# Patient Record
Sex: FEMALE | Race: WHITE | Employment: FULL TIME | URBAN - METROPOLITAN AREA
[De-identification: names, ages, dates, MRNs, and addresses within clinical notes are randomized per-mention and may not be internally consistent; named-entity substitution may affect disease eponyms.]

---

## 2021-04-28 ENCOUNTER — OFFICE VISIT (OUTPATIENT)
Dept: FAMILY MEDICINE CLINIC | Age: 41
End: 2021-04-28
Payer: COMMERCIAL

## 2021-04-28 VITALS
TEMPERATURE: 98 F | SYSTOLIC BLOOD PRESSURE: 124 MMHG | WEIGHT: 205 LBS | BODY MASS INDEX: 34.16 KG/M2 | OXYGEN SATURATION: 98 % | DIASTOLIC BLOOD PRESSURE: 80 MMHG | HEART RATE: 76 BPM | HEIGHT: 65 IN

## 2021-04-28 DIAGNOSIS — Z00.00 ANNUAL PHYSICAL EXAM: ICD-10-CM

## 2021-04-28 DIAGNOSIS — Z76.89 ENCOUNTER TO ESTABLISH CARE: Primary | ICD-10-CM

## 2021-04-28 DIAGNOSIS — R80.1 PERSISTENT PROTEINURIA: ICD-10-CM

## 2021-04-28 DIAGNOSIS — K21.9 GASTROESOPHAGEAL REFLUX DISEASE WITHOUT ESOPHAGITIS: ICD-10-CM

## 2021-04-28 DIAGNOSIS — Z13.220 LIPID SCREENING: ICD-10-CM

## 2021-04-28 DIAGNOSIS — N18.30 STAGE 3 CHRONIC KIDNEY DISEASE, UNSPECIFIED WHETHER STAGE 3A OR 3B CKD (HCC): ICD-10-CM

## 2021-04-28 DIAGNOSIS — E55.9 VITAMIN D DEFICIENCY: ICD-10-CM

## 2021-04-28 DIAGNOSIS — Z11.59 NEED FOR HEPATITIS C SCREENING TEST: ICD-10-CM

## 2021-04-28 DIAGNOSIS — N05.1 FSGS (FOCAL SEGMENTAL GLOMERULOSCLEROSIS): ICD-10-CM

## 2021-04-28 DIAGNOSIS — Z13.1 SCREENING FOR DIABETES MELLITUS (DM): ICD-10-CM

## 2021-04-28 DIAGNOSIS — J30.2 SEASONAL ALLERGIES: ICD-10-CM

## 2021-04-28 DIAGNOSIS — I10 ESSENTIAL HYPERTENSION: ICD-10-CM

## 2021-04-28 PROCEDURE — G8427 DOCREV CUR MEDS BY ELIG CLIN: HCPCS | Performed by: NURSE PRACTITIONER

## 2021-04-28 PROCEDURE — G8417 CALC BMI ABV UP PARAM F/U: HCPCS | Performed by: NURSE PRACTITIONER

## 2021-04-28 PROCEDURE — 99204 OFFICE O/P NEW MOD 45 MIN: CPT | Performed by: NURSE PRACTITIONER

## 2021-04-28 PROCEDURE — 4004F PT TOBACCO SCREEN RCVD TLK: CPT | Performed by: NURSE PRACTITIONER

## 2021-04-28 RX ORDER — OMEPRAZOLE 20 MG/1
20 CAPSULE, DELAYED RELEASE ORAL 2 TIMES DAILY
Qty: 180 CAPSULE | Refills: 3 | Status: SHIPPED | OUTPATIENT
Start: 2021-04-28 | End: 2021-10-26 | Stop reason: SDUPTHER

## 2021-04-28 RX ORDER — ENALAPRIL MALEATE 10 MG/1
10 TABLET ORAL DAILY
Qty: 90 TABLET | Refills: 3 | Status: SHIPPED | OUTPATIENT
Start: 2021-04-28 | End: 2021-07-13 | Stop reason: SDUPTHER

## 2021-04-28 RX ORDER — MONTELUKAST SODIUM 10 MG/1
10 TABLET ORAL NIGHTLY
Qty: 90 TABLET | Refills: 0 | Status: SHIPPED | OUTPATIENT
Start: 2021-04-28 | End: 2021-07-13

## 2021-04-28 RX ORDER — ZINC GLUCONATE 50 MG
50 TABLET ORAL DAILY
COMMUNITY

## 2021-04-28 RX ORDER — CETIRIZINE HYDROCHLORIDE 10 MG/1
10 TABLET ORAL DAILY
COMMUNITY

## 2021-04-28 SDOH — HEALTH STABILITY: MENTAL HEALTH: HOW MANY STANDARD DRINKS CONTAINING ALCOHOL DO YOU HAVE ON A TYPICAL DAY?: 3 OR 4

## 2021-04-28 SDOH — HEALTH STABILITY: MENTAL HEALTH: HOW OFTEN DO YOU HAVE A DRINK CONTAINING ALCOHOL?: MONTHLY OR LESS

## 2021-04-28 SDOH — ECONOMIC STABILITY: TRANSPORTATION INSECURITY
IN THE PAST 12 MONTHS, HAS LACK OF TRANSPORTATION KEPT YOU FROM MEETINGS, WORK, OR FROM GETTING THINGS NEEDED FOR DAILY LIVING?: NO

## 2021-04-28 SDOH — HEALTH STABILITY: PHYSICAL HEALTH: ON AVERAGE, HOW MANY DAYS PER WEEK DO YOU ENGAGE IN MODERATE TO STRENUOUS EXERCISE (LIKE A BRISK WALK)?: 4 DAYS

## 2021-04-28 SDOH — HEALTH STABILITY: PHYSICAL HEALTH: ON AVERAGE, HOW MANY MINUTES DO YOU ENGAGE IN EXERCISE AT THIS LEVEL?: 60 MIN

## 2021-04-28 SDOH — ECONOMIC STABILITY: INCOME INSECURITY: HOW HARD IS IT FOR YOU TO PAY FOR THE VERY BASICS LIKE FOOD, HOUSING, MEDICAL CARE, AND HEATING?: NOT HARD AT ALL

## 2021-04-28 SDOH — ECONOMIC STABILITY: FOOD INSECURITY: WITHIN THE PAST 12 MONTHS, YOU WORRIED THAT YOUR FOOD WOULD RUN OUT BEFORE YOU GOT MONEY TO BUY MORE.: NEVER TRUE

## 2021-04-28 SDOH — ECONOMIC STABILITY: FOOD INSECURITY: WITHIN THE PAST 12 MONTHS, THE FOOD YOU BOUGHT JUST DIDN'T LAST AND YOU DIDN'T HAVE MONEY TO GET MORE.: NEVER TRUE

## 2021-04-28 ASSESSMENT — PATIENT HEALTH QUESTIONNAIRE - PHQ9
SUM OF ALL RESPONSES TO PHQ QUESTIONS 1-9: 0
1. LITTLE INTEREST OR PLEASURE IN DOING THINGS: 0

## 2021-04-28 NOTE — PROGRESS NOTES
[address] [address] [facility]  [phone]    2021     CHIEF COMPLAINT:     Nathaly Astorga (:  1980) is a 37 y.o. female, here for evaluation of the following chief complaint(s):  Establish Care      REVIEWED INFORMATION      Allergies   Allergen Reactions    Prednisone     Codeine     Darvocet A500 [Propoxyphene N-Acetaminophen]        Current Outpatient Medications   Medication Sig Dispense Refill    cetirizine (ZYRTEC) 10 MG tablet Take 10 mg by mouth daily      zinc 50 MG TABS tablet Take 50 mg by mouth daily otc      enalapril (VASOTEC) 10 MG tablet Take 1 tablet by mouth daily 90 tablet 3    omeprazole (PRILOSEC) 20 MG delayed release capsule Take 1 capsule by mouth 2 times daily 180 capsule 3    montelukast (SINGULAIR) 10 MG tablet Take 1 tablet by mouth nightly 90 tablet 0    glucosamine-chondroitin 500-400 MG CAPS Take by mouth daily      magnesium (MAGNESIUM-OXIDE) 250 MG TABS tablet Take 250 mg by mouth daily      amLODIPine (NORVASC) 5 MG tablet TAKE 1 TABLET BY MOUTH DAILY (Patient taking differently: Take 2.5 mg by mouth daily ) 90 tablet 3    Ascorbic Acid (VITAMIN C) 250 MG tablet Take 500 mg by mouth daily        No current facility-administered medications for this visit. Patient Care Team:  Uri Cos, APRN - CNP as PCP - General (Nurse Practitioner)  Dolph Cos, APRN - CNP as PCP - [facility]  Carolynn Restrepo MD as Consulting Physician (Nephrology)  Craoline Arciniega MD (Obstetrics & Gynecology)    REVIEW OF SYSTEMS:     Review of Systems   Constitutional: Negative for activity change, fatigue and unexpected weight change. HENT: Negative for congestion, ear pain, hearing loss, rhinorrhea and sore throat. Respiratory: Negative for cough and shortness of breath. Cardiovascular: Negative for chest pain, palpitations and leg swelling. Gastrointestinal: Negative for constipation and diarrhea. Musculoskeletal: Negative for arthralgias and gait problem. Neurological: Negative for dizziness, weakness and headaches. Psychiatric/Behavioral: Negative for confusion. The patient is not nervous/anxious. HISTORY OF PRESENT ILLNESS     ESTABLISHING CARE      Gerald Fam is a 37 y.o. female who presents today to establish relationship with office. Patient indicates that no relationship ship has been established with primary care provider in past. The purpose of today's visit is the following: Establish Care      Her chronic medical conditions are noted below:  Patient Active Problem List:     CKD (chronic kidney disease) stage 3, GFR 30-59 ml/min Stable, Monitored by nephrology      FSGS (focal segmental glomerulosclerosis) Stable, Monitored by nephrology     Proteinuria Stable, Monitored nephrology      Essential hypertension Stable, Medicated, Monitored      Gastroesophageal reflux disease without esophagitis Stable, Monitored      Seasonal allergies Stable, Monitored       Patient is a sachi 43-year-old woman who is here today to establish care her list of chronic comorbidities are listed above. She does see certain specialist in order to be able to continue with her care as listed above. Patient overall is stable however wanted to establish with primary care if she has not been seen for several years. Patient overall denies any shortness of breath, palpitation, or chest pain. Denies any muscular skeletal issues at this time. Patient does have a longstanding history of chronic kidney disease for which she is managed by nephrology. Hypertension has also been managed by nephrology, however not all medications were filled by them. Patient's blood pressure is stable today she denies any acute issues.   Preventative care has been discussed with patient labs have been ordered and patient will be seen back for follow-up                 PHYSICAL EXAM:     /80   Pulse 76   Temp 98 °F (36.7 °C)

## 2021-05-02 ASSESSMENT — ENCOUNTER SYMPTOMS
SORE THROAT: 0
RHINORRHEA: 0
SHORTNESS OF BREATH: 0
CONSTIPATION: 0
COUGH: 0
DIARRHEA: 0

## 2021-05-21 LAB
ALBUMIN SERPL-MCNC: 3.9 G/DL
ALP BLD-CCNC: 92 U/L
ALT SERPL-CCNC: 16 U/L
AST SERPL-CCNC: 19 U/L
AVERAGE GLUCOSE: 108
BASOPHILS ABSOLUTE: NORMAL
BASOPHILS RELATIVE PERCENT: NORMAL
BILIRUB SERPL-MCNC: 0.4 MG/DL (ref 0.1–1.4)
BUN BLDV-MCNC: 35 MG/DL
CALCIUM SERPL-MCNC: 9.4 MG/DL
CHLORIDE BLD-SCNC: 108 MMOL/L
CHOLESTEROL, TOTAL: 200 MG/DL
CHOLESTEROL/HDL RATIO: 3.6
CO2: 21 MMOL/L
CREAT SERPL-MCNC: 1.77 MG/DL
EOSINOPHILS ABSOLUTE: NORMAL
EOSINOPHILS RELATIVE PERCENT: NORMAL
GLUCOSE FASTING: 98 MG/DL
HBA1C MFR BLD: 5.4 %
HCT VFR BLD CALC: 39.7 % (ref 36–46)
HDLC SERPL-MCNC: 55 MG/DL (ref 35–70)
HEMOGLOBIN: 13 G/DL (ref 12–16)
LDL CHOLESTEROL CALCULATED: 110 MG/DL (ref 0–160)
LYMPHOCYTES ABSOLUTE: NORMAL
LYMPHOCYTES RELATIVE PERCENT: NORMAL
MCH RBC QN AUTO: 29.4 PG
MCHC RBC AUTO-ENTMCNC: 32.9 G/DL
MCV RBC AUTO: 90 FL
MONOCYTES ABSOLUTE: NORMAL
MONOCYTES RELATIVE PERCENT: NORMAL
NEUTROPHILS ABSOLUTE: NORMAL
NEUTROPHILS RELATIVE PERCENT: NORMAL
NONHDLC SERPL-MCNC: NORMAL MG/DL
PLATELET # BLD: 287 K/ΜL
PMV BLD AUTO: 9.5 FL
POTASSIUM SERPL-SCNC: 4.8 MMOL/L
RBC # BLD: 4.43 10^6/ΜL
SODIUM BLD-SCNC: 141 MMOL/L
TOTAL PROTEIN: 6.8 G/DL (ref 6.4–8.2)
TRIGL SERPL-MCNC: 173 MG/DL
TSH SERPL DL<=0.05 MIU/L-ACNC: 0.94 UIU/ML
VLDLC SERPL CALC-MCNC: 35 MG/DL
WBC # BLD: 8.8 10^3/ML

## 2021-06-10 ENCOUNTER — OFFICE VISIT (OUTPATIENT)
Dept: FAMILY MEDICINE CLINIC | Age: 41
End: 2021-06-10
Payer: COMMERCIAL

## 2021-06-10 VITALS
OXYGEN SATURATION: 99 % | DIASTOLIC BLOOD PRESSURE: 78 MMHG | TEMPERATURE: 97 F | WEIGHT: 200 LBS | BODY MASS INDEX: 33.28 KG/M2 | SYSTOLIC BLOOD PRESSURE: 116 MMHG | HEART RATE: 74 BPM

## 2021-06-10 DIAGNOSIS — I10 ESSENTIAL HYPERTENSION: Primary | ICD-10-CM

## 2021-06-10 DIAGNOSIS — Z13.220 LIPID SCREENING: ICD-10-CM

## 2021-06-10 DIAGNOSIS — Z13.1 SCREENING FOR DIABETES MELLITUS (DM): ICD-10-CM

## 2021-06-10 DIAGNOSIS — E55.9 VITAMIN D DEFICIENCY: ICD-10-CM

## 2021-06-10 DIAGNOSIS — N18.30 STAGE 3 CHRONIC KIDNEY DISEASE, UNSPECIFIED WHETHER STAGE 3A OR 3B CKD (HCC): ICD-10-CM

## 2021-06-10 DIAGNOSIS — I10 ESSENTIAL HYPERTENSION: ICD-10-CM

## 2021-06-10 DIAGNOSIS — Z00.00 ANNUAL PHYSICAL EXAM: ICD-10-CM

## 2021-06-10 PROCEDURE — G8417 CALC BMI ABV UP PARAM F/U: HCPCS | Performed by: NURSE PRACTITIONER

## 2021-06-10 PROCEDURE — 99214 OFFICE O/P EST MOD 30 MIN: CPT | Performed by: NURSE PRACTITIONER

## 2021-06-10 PROCEDURE — 4004F PT TOBACCO SCREEN RCVD TLK: CPT | Performed by: NURSE PRACTITIONER

## 2021-06-10 PROCEDURE — G8427 DOCREV CUR MEDS BY ELIG CLIN: HCPCS | Performed by: NURSE PRACTITIONER

## 2021-06-10 NOTE — PATIENT INSTRUCTIONS
It was my pleasure to meet with you today. Please contact me with any questions or concerns, and please notify myself or our manager if there is anyway we can improve our service in your health care needs.  Below I have listed some instructions and information that pertain to today's visit.    -You have been advised to continue all current medication, otherwise not discussed in today's visit  -New medications and refills will been sent and made available at pharmacy or mail away  -Heathy daily diet to include low salt, low fat heart healthy and low carbohydrate, low sugar diabetic diet  -Drink 6-8 glasses of water daily

## 2021-06-10 NOTE — PROGRESS NOTES
Negative for constipation and diarrhea. Musculoskeletal: Negative for arthralgias and gait problem. Neurological: Negative for dizziness, weakness and headaches. Psychiatric/Behavioral: Negative for confusion. The patient is not nervous/anxious. HISTORY OF PRESENT ILLNESS     Lipid Panel is good - LDL - \"bad cholesterol\" numbers are good. HDL \"good cholesterol\" is borderline. Triglyceride numbers are slightly elevated. At this time I highly encourage a change in lifestyle to include healthy diet of lean means, low carbohydrates, and low sugars. As well increase in physical activity    Thyroid level value is stable. Blood sugar is stable  Liver function is stable  Kidney function is stable  Blood counts are stable with no indication of anemia or low platelets. No changes to plan of care at this time unless addressed in this communication. Continue with any medications you are currently taking, with no change to dosages. I always encourage healthy life style practice including diet low in carbohydrates, hidden sugars, good amount of physical activity, stay hydrated, stress relieve strategies such as meditation and deep breathing, and get a good night sleep. Overall patient is doing well - denies any chest pain, palpitations, or shortness of breath  No other complaints at this time. PHYSICAL EXAM:     /78   Pulse 74   Temp 97 °F (36.1 °C) (Temporal)   Wt 200 lb (90.7 kg)   LMP 05/17/2021   SpO2 99%   BMI 33.28 kg/m²      Physical Exam  Vitals reviewed. Constitutional:       Appearance: Normal appearance. She is not ill-appearing. Cardiovascular:      Rate and Rhythm: Normal rate and regular rhythm. Heart sounds: No murmur heard. No friction rub. No gallop. Pulmonary:      Effort: Pulmonary effort is normal. No respiratory distress. Breath sounds: No wheezing. Abdominal:      General: Bowel sounds are normal.      Palpations: Abdomen is soft.

## 2021-06-22 ASSESSMENT — ENCOUNTER SYMPTOMS
SHORTNESS OF BREATH: 0
DIARRHEA: 0
CONSTIPATION: 0
RHINORRHEA: 0
SORE THROAT: 0
COUGH: 0

## 2021-07-13 DIAGNOSIS — J30.2 SEASONAL ALLERGIES: ICD-10-CM

## 2021-07-13 DIAGNOSIS — I10 ESSENTIAL HYPERTENSION: ICD-10-CM

## 2021-07-13 RX ORDER — ENALAPRIL MALEATE 10 MG/1
10 TABLET ORAL DAILY
Qty: 90 TABLET | Refills: 1 | Status: SHIPPED | OUTPATIENT
Start: 2021-07-13 | End: 2022-01-04

## 2021-07-13 RX ORDER — MONTELUKAST SODIUM 10 MG/1
10 TABLET ORAL NIGHTLY
Qty: 90 TABLET | Refills: 1 | Status: SHIPPED | OUTPATIENT
Start: 2021-07-13 | End: 2022-01-04

## 2021-07-13 NOTE — TELEPHONE ENCOUNTER
LOV 6/10/21  LRF 4/28/21, Both meds  PATIENT SWITCHING TO MAIL AWAY  RTO 1 year,     Health Maintenance   Topic Date Due    Hepatitis C screen  Never done    Pneumococcal 0-64 years Vaccine (1 of 2 - PPSV23) Never done    DTaP/Tdap/Td vaccine (1 - Tdap) Never done    Flu vaccine (1) 09/01/2021    Potassium monitoring  05/21/2022    Creatinine monitoring  05/21/2022    Cervical cancer screen  04/28/2024    Lipid screen  05/21/2026    COVID-19 Vaccine  Completed    Hepatitis A vaccine  Aged Out    Hepatitis B vaccine  Aged Out    Hib vaccine  Aged Out    Meningococcal (ACWY) vaccine  Aged Out    Varicella vaccine  Discontinued    HIV screen  Discontinued             (applicable per patient's age: Cancer Screenings, Depression Screening, Fall Risk Screening, Immunizations)    Hemoglobin A1C (%)   Date Value   05/21/2021 5.4     LDL Calculated (mg/dL)   Date Value   05/21/2021 110     AST (U/L)   Date Value   05/21/2021 19     ALT (U/L)   Date Value   05/21/2021 16     BUN (mg/dL)   Date Value   05/21/2021 35      (goal A1C is < 7)   (goal LDL is <100) need 30-50% reduction from baseline     BP Readings from Last 3 Encounters:   06/10/21 116/78   04/28/21 124/80   02/23/21 122/82    (goal /80)      All Future Testing planned in [facility]:  Lab Frequency Next Occurrence   PTH, Intact Once 07/12/2021   Vitamin D 25 Hydroxy Once 07/12/2021   CBC Auto Differential Once 07/12/2021   Magnesium Once 78/70/8814   Basic Metabolic Panel Once 57/18/5384   Phosphorus Once 07/12/2021   Creatinine, Random Urine Once 07/12/2021   Protein, urine, random Once 07/12/2021   Hepatitis C Antibody Once 04/28/2022   CBC Once 05/21/2022   Comprehensive Metabolic Panel, Fasting Once 05/21/2022   Lipid Panel Once 05/21/2022   TSH without Reflex Once 05/21/2022       Next Visit Date:  Future Appointments   Date Time Provider Tapan Vasquez   8/31/2021  8:50 AM Bikram Asuncion Severe, MD [facility] None            Patient Active Problem List:     CKD (chronic kidney disease) stage 3, GFR 30-59 ml/min ([facility])     FSGS (focal segmental glomerulosclerosis)     Proteinuria     Essential hypertension     Gastroesophageal reflux disease without esophagitis     Seasonal allergies

## 2021-08-23 ENCOUNTER — HOSPITAL ENCOUNTER (OUTPATIENT)
Age: 41
Setting detail: SPECIMEN
Discharge: HOME | End: 2021-08-23
Payer: COMMERCIAL

## 2021-08-23 DIAGNOSIS — I10 ESSENTIAL HYPERTENSION: ICD-10-CM

## 2021-08-23 DIAGNOSIS — N18.31 STAGE 3A CHRONIC KIDNEY DISEASE (HCC): ICD-10-CM

## 2021-08-23 DIAGNOSIS — N05.1 FSGS (FOCAL SEGMENTAL GLOMERULOSCLEROSIS): ICD-10-CM

## 2021-08-23 LAB
ABSOLUTE EOS #: 0.09 K/UL (ref 0–0.44)
ABSOLUTE IMMATURE GRANULOCYTE: 0.03 K/UL (ref 0–0.3)
ABSOLUTE LYMPH #: 2.64 K/UL (ref 1.1–3.7)
ABSOLUTE MONO #: 0.56 K/UL (ref 0.1–1.2)
ANION GAP SERPL CALCULATED.3IONS-SCNC: 16 MMOL/L (ref 9–17)
BASOPHILS # BLD: 1 % (ref 0–2)
BASOPHILS ABSOLUTE: 0.08 K/UL (ref 0–0.2)
BUN BLDV-MCNC: 41 MG/DL (ref 6–20)
BUN/CREAT BLD: ABNORMAL (ref 9–20)
CALCIUM SERPL-MCNC: 8.6 MG/DL (ref 8.6–10.4)
CHLORIDE BLD-SCNC: 107 MMOL/L (ref 98–107)
CO2: 15 MMOL/L (ref 20–31)
CREAT SERPL-MCNC: 1.87 MG/DL (ref 0.5–0.9)
CREATININE URINE: 67 MG/DL (ref 28–217)
DIFFERENTIAL TYPE: ABNORMAL
EOSINOPHILS RELATIVE PERCENT: 1 % (ref 1–4)
GFR AFRICAN AMERICAN: 36 ML/MIN
GFR NON-AFRICAN AMERICAN: 30 ML/MIN
GFR SERPL CREATININE-BSD FRML MDRD: ABNORMAL ML/MIN/{1.73_M2}
GFR SERPL CREATININE-BSD FRML MDRD: ABNORMAL ML/MIN/{1.73_M2}
GLUCOSE BLD-MCNC: 183 MG/DL (ref 70–99)
HCT VFR BLD CALC: 37.5 % (ref 36.3–47.1)
HEMOGLOBIN: 11.6 G/DL (ref 11.9–15.1)
IMMATURE GRANULOCYTES: 0 %
LYMPHOCYTES # BLD: 28 % (ref 24–43)
MAGNESIUM: 2.1 MG/DL (ref 1.6–2.6)
MCH RBC QN AUTO: 29.7 PG (ref 25.2–33.5)
MCHC RBC AUTO-ENTMCNC: 30.9 G/DL (ref 28.4–34.8)
MCV RBC AUTO: 95.9 FL (ref 82.6–102.9)
MONOCYTES # BLD: 6 % (ref 3–12)
NRBC AUTOMATED: 0 PER 100 WBC
PDW BLD-RTO: 15.7 % (ref 11.8–14.4)
PHOSPHORUS: 3.4 MG/DL (ref 2.6–4.5)
PLATELET # BLD: 292 K/UL (ref 138–453)
PLATELET ESTIMATE: ABNORMAL
PMV BLD AUTO: 11 FL (ref 8.1–13.5)
POTASSIUM SERPL-SCNC: 4.9 MMOL/L (ref 3.7–5.3)
PTH INTACT: 89.35 PG/ML (ref 15–65)
RBC # BLD: 3.91 M/UL (ref 3.95–5.11)
RBC # BLD: ABNORMAL 10*6/UL
SEG NEUTROPHILS: 64 % (ref 36–65)
SEGMENTED NEUTROPHILS ABSOLUTE COUNT: 6.02 K/UL (ref 1.5–8.1)
SODIUM BLD-SCNC: 138 MMOL/L (ref 135–144)
TOTAL PROTEIN, URINE: 22 MG/DL
VITAMIN D 25-HYDROXY: 29.1 NG/ML (ref 30–100)
WBC # BLD: 9.4 K/UL (ref 3.5–11.3)
WBC # BLD: ABNORMAL 10*3/UL

## 2021-10-26 DIAGNOSIS — K21.9 GASTROESOPHAGEAL REFLUX DISEASE WITHOUT ESOPHAGITIS: ICD-10-CM

## 2021-10-26 RX ORDER — OMEPRAZOLE 20 MG/1
20 CAPSULE, DELAYED RELEASE ORAL 2 TIMES DAILY
Qty: 180 CAPSULE | Refills: 3 | Status: SHIPPED | OUTPATIENT
Start: 2021-10-26 | End: 2022-08-10 | Stop reason: SDUPTHER

## 2021-10-26 NOTE — TELEPHONE ENCOUNTER
LOV 6/10/21  LRF 4/28/21 Patient switching Pharmacies  RTO 1 year    Health Maintenance   Topic Date Due    Hepatitis C screen  Never done    Pneumococcal 0-64 years Vaccine (1 of 2 - PPSV23) Never done    DTaP/Tdap/Td vaccine (1 - Tdap) Never done    Flu vaccine (1) 09/01/2021    Potassium monitoring  08/23/2022    Creatinine monitoring  08/23/2022    Cervical cancer screen  04/28/2024    Diabetes screen  05/21/2024    Lipid screen  05/21/2026    COVID-19 Vaccine  Completed    Hepatitis A vaccine  Aged Out    Hepatitis B vaccine  Aged Out    Hib vaccine  Aged Out    Meningococcal (ACWY) vaccine  Aged Out    Varicella vaccine  Discontinued    HIV screen  Discontinued             (applicable per patient's age: Cancer Screenings, Depression Screening, Fall Risk Screening, Immunizations)    Hemoglobin A1C (%)   Date Value   05/21/2021 5.4     LDL Calculated (mg/dL)   Date Value   05/21/2021 110     AST (U/L)   Date Value   05/21/2021 19     ALT (U/L)   Date Value   05/21/2021 16     BUN (mg/dL)   Date Value   08/23/2021 41 (H)      (goal A1C is < 7)   (goal LDL is <100) need 30-50% reduction from baseline     BP Readings from Last 3 Encounters:   08/31/21 110/68   06/10/21 116/78   04/28/21 124/80    (goal /80)      All Future Testing planned in [facility]:  Lab Frequency Next Occurrence   Hepatitis C Antibody Once 04/28/2022   CBC Once 05/21/2022   Comprehensive Metabolic Panel, Fasting Once 05/21/2022   Lipid Panel Once 05/21/2022   TSH without Reflex Once 05/21/2022   Vitamin D 25 Hydroxy Once 01/17/2022   PTH, Intact Once 01/17/2022   CBC Auto Differential Once 01/17/2022   Magnesium Once 01/17/2022   Phosphorus Once 91/69/2731   Basic Metabolic Panel Once 90/98/1080   Creatinine, Random Urine Once 01/17/2022   Protein, urine, random Once 01/17/2022       Next Visit Date:  Future Appointments   Date Time Provider Tapan Vasquez   2/15/2022  8:30 AM Jose Luis George MD [facility] None

## 2022-01-03 DIAGNOSIS — J30.2 SEASONAL ALLERGIES: ICD-10-CM

## 2022-01-03 DIAGNOSIS — I10 ESSENTIAL HYPERTENSION: ICD-10-CM

## 2022-01-04 NOTE — TELEPHONE ENCOUNTER
LOV 6/10/21  LRF 7/13/21  RTO 1 year    Health Maintenance   Topic Date Due    Hepatitis C screen  Never done    Pneumococcal 0-64 years Vaccine (1 of 2 - PPSV23) Never done    DTaP/Tdap/Td vaccine (1 - Tdap) Never done    Flu vaccine (1) 09/01/2021    COVID-19 Vaccine (3 - Booster for Pfizer series) 11/07/2021    Depression Screen  04/28/2022    Potassium monitoring  08/23/2022    Creatinine monitoring  08/23/2022    Cervical cancer screen  04/28/2024    Diabetes screen  05/21/2024    Lipid screen  05/21/2026    Hepatitis A vaccine  Aged Out    Hepatitis B vaccine  Aged Out    Hib vaccine  Aged Out    Meningococcal (ACWY) vaccine  Aged Out    Varicella vaccine  Discontinued    HIV screen  Discontinued             (applicable per patient's age: Cancer Screenings, Depression Screening, Fall Risk Screening, Immunizations)    Hemoglobin A1C (%)   Date Value   05/21/2021 5.4     LDL Calculated (mg/dL)   Date Value   05/21/2021 110     AST (U/L)   Date Value   05/21/2021 19     ALT (U/L)   Date Value   05/21/2021 16     BUN (mg/dL)   Date Value   08/23/2021 41 (H)      (goal A1C is < 7)   (goal LDL is <100) need 30-50% reduction from baseline     BP Readings from Last 3 Encounters:   08/31/21 110/68   06/10/21 116/78   04/28/21 124/80    (goal /80)      All Future Testing planned in [facility]:  Lab Frequency Next Occurrence   Hepatitis C Antibody Once 04/28/2022   CBC Once 05/21/2022   Comprehensive Metabolic Panel, Fasting Once 05/21/2022   Lipid Panel Once 05/21/2022   TSH without Reflex Once 05/21/2022   Vitamin D 25 Hydroxy Once 01/17/2022   PTH, Intact Once 01/17/2022   CBC Auto Differential Once 01/17/2022   Magnesium Once 01/17/2022   Phosphorus Once 18/19/6409   Basic Metabolic Panel Once 59/61/8284   Creatinine, Random Urine Once 01/17/2022   Protein, urine, random Once 01/17/2022       Next Visit Date:  Future Appointments   Date Time Provider Tapan Vasquez   2/15/2022  8:30 AM Jose Luis Jaquan Lees MD [facility] None            Patient Active Problem List:     CKD (chronic kidney disease) stage 3, GFR 30-59 ml/min ([facility])     FSGS (focal segmental glomerulosclerosis)     Proteinuria     Essential hypertension     Gastroesophageal reflux disease without esophagitis     Seasonal allergies

## 2022-01-05 RX ORDER — MONTELUKAST SODIUM 10 MG/1
10 TABLET ORAL NIGHTLY
Qty: 90 TABLET | Refills: 3 | Status: SHIPPED | OUTPATIENT
Start: 2022-01-05

## 2022-01-05 RX ORDER — ENALAPRIL MALEATE 10 MG/1
10 TABLET ORAL DAILY
Qty: 90 TABLET | Refills: 3 | Status: SHIPPED | OUTPATIENT
Start: 2022-01-05

## 2022-02-09 ENCOUNTER — HOSPITAL ENCOUNTER (OUTPATIENT)
Age: 42
Setting detail: SPECIMEN
Discharge: HOME | End: 2022-02-09

## 2022-02-09 DIAGNOSIS — N18.31 STAGE 3A CHRONIC KIDNEY DISEASE (HCC): ICD-10-CM

## 2022-02-09 DIAGNOSIS — N05.1 FSGS (FOCAL SEGMENTAL GLOMERULOSCLEROSIS): ICD-10-CM

## 2022-02-09 LAB
ABSOLUTE EOS #: 0.09 K/UL (ref 0–0.44)
ABSOLUTE IMMATURE GRANULOCYTE: 0.03 K/UL (ref 0–0.3)
ABSOLUTE LYMPH #: 2.92 K/UL (ref 1.1–3.7)
ABSOLUTE MONO #: 0.71 K/UL (ref 0.1–1.2)
ANION GAP SERPL CALCULATED.3IONS-SCNC: 16 MMOL/L (ref 9–17)
BASOPHILS # BLD: 1 % (ref 0–2)
BASOPHILS ABSOLUTE: 0.11 K/UL (ref 0–0.2)
BUN BLDV-MCNC: 47 MG/DL (ref 6–20)
BUN/CREAT BLD: ABNORMAL (ref 9–20)
CALCIUM SERPL-MCNC: 9.5 MG/DL (ref 8.6–10.4)
CHLORIDE BLD-SCNC: 107 MMOL/L (ref 98–107)
CO2: 16 MMOL/L (ref 20–31)
CREAT SERPL-MCNC: 1.84 MG/DL (ref 0.5–0.9)
CREATININE URINE: 89.9 MG/DL (ref 28–217)
DIFFERENTIAL TYPE: ABNORMAL
EOSINOPHILS RELATIVE PERCENT: 1 % (ref 1–4)
GFR AFRICAN AMERICAN: 37 ML/MIN
GFR NON-AFRICAN AMERICAN: 30 ML/MIN
GFR SERPL CREATININE-BSD FRML MDRD: ABNORMAL ML/MIN/{1.73_M2}
GFR SERPL CREATININE-BSD FRML MDRD: ABNORMAL ML/MIN/{1.73_M2}
GLUCOSE BLD-MCNC: 84 MG/DL (ref 70–99)
HCT VFR BLD CALC: 37.6 % (ref 36.3–47.1)
HEMOGLOBIN: 12 G/DL (ref 11.9–15.1)
IMMATURE GRANULOCYTES: 0 %
LYMPHOCYTES # BLD: 34 % (ref 24–43)
MAGNESIUM: 2 MG/DL (ref 1.6–2.6)
MCH RBC QN AUTO: 30.2 PG (ref 25.2–33.5)
MCHC RBC AUTO-ENTMCNC: 31.9 G/DL (ref 28.4–34.8)
MCV RBC AUTO: 94.7 FL (ref 82.6–102.9)
MONOCYTES # BLD: 8 % (ref 3–12)
NRBC AUTOMATED: 0 PER 100 WBC
PDW BLD-RTO: 14.5 % (ref 11.8–14.4)
PHOSPHORUS: 4.3 MG/DL (ref 2.6–4.5)
PLATELET # BLD: 290 K/UL (ref 138–453)
PLATELET ESTIMATE: ABNORMAL
PMV BLD AUTO: 10.9 FL (ref 8.1–13.5)
POTASSIUM SERPL-SCNC: 4.9 MMOL/L (ref 3.7–5.3)
PTH INTACT: 69.05 PG/ML (ref 15–65)
RBC # BLD: 3.97 M/UL (ref 3.95–5.11)
RBC # BLD: ABNORMAL 10*6/UL
SEG NEUTROPHILS: 56 % (ref 36–65)
SEGMENTED NEUTROPHILS ABSOLUTE COUNT: 4.66 K/UL (ref 1.5–8.1)
SODIUM BLD-SCNC: 139 MMOL/L (ref 135–144)
TOTAL PROTEIN, URINE: 34 MG/DL
WBC # BLD: 8.5 K/UL (ref 3.5–11.3)
WBC # BLD: ABNORMAL 10*3/UL

## 2022-02-10 LAB — VITAMIN D 25-HYDROXY: 29 NG/ML (ref 30–100)

## 2022-04-27 ENCOUNTER — TELEMEDICINE (OUTPATIENT)
Dept: FAMILY MEDICINE CLINIC | Age: 42
End: 2022-04-27
Payer: COMMERCIAL

## 2022-04-27 DIAGNOSIS — J01.00 ACUTE MAXILLARY SINUSITIS, RECURRENCE NOT SPECIFIED: Primary | ICD-10-CM

## 2022-04-27 PROCEDURE — 99214 OFFICE O/P EST MOD 30 MIN: CPT | Performed by: NURSE PRACTITIONER

## 2022-04-27 PROCEDURE — G8427 DOCREV CUR MEDS BY ELIG CLIN: HCPCS | Performed by: NURSE PRACTITIONER

## 2022-04-27 RX ORDER — AMOXICILLIN 500 MG/1
500 CAPSULE ORAL 2 TIMES DAILY
Qty: 14 CAPSULE | Refills: 0 | Status: SHIPPED | OUTPATIENT
Start: 2022-04-27 | End: 2022-05-04

## 2022-04-27 RX ORDER — FLUTICASONE PROPIONATE 50 MCG
2 SPRAY, SUSPENSION (ML) NASAL DAILY
Qty: 48 G | Refills: 1 | Status: SHIPPED | OUTPATIENT
Start: 2022-04-27

## 2022-04-27 RX ORDER — PREDNISONE 20 MG/1
20 TABLET ORAL DAILY
Qty: 5 TABLET | Refills: 0 | Status: SHIPPED | OUTPATIENT
Start: 2022-04-27 | End: 2022-05-02

## 2022-04-27 ASSESSMENT — PATIENT HEALTH QUESTIONNAIRE - PHQ9
1. LITTLE INTEREST OR PLEASURE IN DOING THINGS: 0
2. FEELING DOWN, DEPRESSED OR HOPELESS: 0
SUM OF ALL RESPONSES TO PHQ QUESTIONS 1-9: 0
SUM OF ALL RESPONSES TO PHQ9 QUESTIONS 1 & 2: 0
SUM OF ALL RESPONSES TO PHQ QUESTIONS 1-9: 0

## 2022-04-27 ASSESSMENT — ANXIETY QUESTIONNAIRES
7. FEELING AFRAID AS IF SOMETHING AWFUL MIGHT HAPPEN: 0
GAD7 TOTAL SCORE: 0
1. FEELING NERVOUS, ANXIOUS, OR ON EDGE: 0
3. WORRYING TOO MUCH ABOUT DIFFERENT THINGS: 0
5. BEING SO RESTLESS THAT IT IS HARD TO SIT STILL: 0
2. NOT BEING ABLE TO STOP OR CONTROL WORRYING: 0
4. TROUBLE RELAXING: 0
6. BECOMING EASILY ANNOYED OR IRRITABLE: 0
IF YOU CHECKED OFF ANY PROBLEMS ON THIS QUESTIONNAIRE, HOW DIFFICULT HAVE THESE PROBLEMS MADE IT FOR YOU TO DO YOUR WORK, TAKE CARE OF THINGS AT HOME, OR GET ALONG WITH OTHER PEOPLE: NOT DIFFICULT AT ALL

## 2022-04-27 ASSESSMENT — ENCOUNTER SYMPTOMS: COUGH: 1

## 2022-04-27 NOTE — PATIENT INSTRUCTIONS
[employer]Med NasoGel for Dry Noses    Sodium HyaluronateAloe Vera NasoGel provides moisture to hydrate and lubricate dry and irritated nasal passages caused by dry climate and indoor heat; It helps reduce nasal dryness experienced during air travel, oxygen and CPAP use, as well as dryness symptoms caused by atrophic rhinitis, post radiation therapy and sinus surgery. Suggested Use Apply a small amount of NasoGEL into each nostril every 4 to 6 hours.

## 2022-04-27 NOTE — PROGRESS NOTES
Sujata Trinh (:  1980) is a Established patient, here for evaluation of the following:    Chief Complaint   Patient presents with    Sinusitis     x1 month     Cough     x1 month, pt has lost her voice         Assessment & Plan   Below is the assessment and plan developed based on review of pertinent history, physical exam, labs, studies, and medications. 1. Acute maxillary sinusitis, recurrence not specified  -     predniSONE (DELTASONE) 20 MG tablet; Take 1 tablet by mouth daily for 5 days, Disp-5 tablet, R-0Normal  -     fluticasone (FLONASE) 50 MCG/ACT nasal spray; 2 sprays by Each Nostril route daily, Disp-48 g, R-1Normal  -     amoxicillin (AMOXIL) 500 MG capsule; Take 1 capsule by mouth 2 times daily for 7 days, Disp-14 capsule, R-0Normal    Return if symptoms worsen or fail to improve. Subjective   Cough   Post nasal drip    No sore throat  Sinus congestion  No ear pain    Mucous drainag clear with slight yellow tint    No fever     Reports that she has consitently for the last 2 weeks. Mucinex, tylenol cold and sinus     Sinusitis  Associated symptoms include coughing. Cough      Review of Systems   Respiratory: Positive for cough. Objective   Patient-Reported Vitals  No data recorded     Physical Exam  Constitutional:       Appearance: Normal appearance. She is well-developed. She is ill-appearing. Eyes:      General:         Right eye: No discharge. Left eye: No discharge. Extraocular Movements: Extraocular movements intact. Conjunctiva/sclera: Conjunctivae normal.   Neck:      Thyroid: No thyroid mass. Vascular: No JVD. Pulmonary:      Effort: Pulmonary effort is normal. No respiratory distress. Musculoskeletal:      Right shoulder: No deformity. Normal range of motion. Left shoulder: No deformity. Normal range of motion. Cervical back: Normal range of motion.    Skin:     General: Skin is moist.      Coloration: Skin is not cyanotic or jaundiced. Findings: No rash. Neurological:      General: No focal deficit present. Mental Status: She is alert and oriented to person, place, and time. Gait: Gait is intact. Psychiatric:         Attention and Perception: Attention normal. She does not perceive auditory or visual hallucinations. Mood and Affect: Mood normal.         Speech: Speech normal.                  Natalia Castellon was evaluated through a synchronous (real-time) audio-video encounter. The patient (or guardian if applicable) is aware that this is a billable service, which includes applicable co-pays. This Virtual Visit was conducted with patient's (and/or legal guardian's) consent. The visit was conducted pursuant to the emergency declaration under the [address] [place] [address], [address] [place] [address] authority and the [employer] General Act. Patient identification was verified, and a caregiver was present when appropriate. The patient was located at home in a state where the provider was licensed to provide care.        --Steffanie Valdez, APRN - CNP

## 2022-08-10 ENCOUNTER — OFFICE VISIT (OUTPATIENT)
Dept: FAMILY MEDICINE CLINIC | Age: 42
End: 2022-08-10
Payer: COMMERCIAL

## 2022-08-10 VITALS
WEIGHT: 208 LBS | OXYGEN SATURATION: 99 % | HEART RATE: 102 BPM | SYSTOLIC BLOOD PRESSURE: 110 MMHG | HEIGHT: 65 IN | BODY MASS INDEX: 34.66 KG/M2 | TEMPERATURE: 97.4 F | DIASTOLIC BLOOD PRESSURE: 70 MMHG

## 2022-08-10 DIAGNOSIS — K21.9 GASTROESOPHAGEAL REFLUX DISEASE WITHOUT ESOPHAGITIS: ICD-10-CM

## 2022-08-10 DIAGNOSIS — D64.9 LOW HEMOGLOBIN: Primary | ICD-10-CM

## 2022-08-10 DIAGNOSIS — R10.30 LOWER ABDOMINAL PAIN: ICD-10-CM

## 2022-08-10 DIAGNOSIS — R19.5 LOOSE STOOLS: ICD-10-CM

## 2022-08-10 PROCEDURE — G8417 CALC BMI ABV UP PARAM F/U: HCPCS | Performed by: NURSE PRACTITIONER

## 2022-08-10 PROCEDURE — 99214 OFFICE O/P EST MOD 30 MIN: CPT | Performed by: NURSE PRACTITIONER

## 2022-08-10 PROCEDURE — G8427 DOCREV CUR MEDS BY ELIG CLIN: HCPCS | Performed by: NURSE PRACTITIONER

## 2022-08-10 PROCEDURE — 1036F TOBACCO NON-USER: CPT | Performed by: NURSE PRACTITIONER

## 2022-08-10 RX ORDER — OMEPRAZOLE 20 MG/1
20 CAPSULE, DELAYED RELEASE ORAL 2 TIMES DAILY
Qty: 180 CAPSULE | Refills: 3 | Status: SHIPPED | OUTPATIENT
Start: 2022-08-10 | End: 2023-08-10

## 2022-08-10 SDOH — ECONOMIC STABILITY: FOOD INSECURITY: WITHIN THE PAST 12 MONTHS, THE FOOD YOU BOUGHT JUST DIDN'T LAST AND YOU DIDN'T HAVE MONEY TO GET MORE.: NEVER TRUE

## 2022-08-10 SDOH — ECONOMIC STABILITY: FOOD INSECURITY: WITHIN THE PAST 12 MONTHS, YOU WORRIED THAT YOUR FOOD WOULD RUN OUT BEFORE YOU GOT MONEY TO BUY MORE.: NEVER TRUE

## 2022-08-10 ASSESSMENT — SOCIAL DETERMINANTS OF HEALTH (SDOH): HOW HARD IS IT FOR YOU TO PAY FOR THE VERY BASICS LIKE FOOD, HOUSING, MEDICAL CARE, AND HEATING?: NOT HARD AT ALL

## 2022-08-10 ASSESSMENT — ANXIETY QUESTIONNAIRES
5. BEING SO RESTLESS THAT IT IS HARD TO SIT STILL: 0
6. BECOMING EASILY ANNOYED OR IRRITABLE: 1
3. WORRYING TOO MUCH ABOUT DIFFERENT THINGS: 0
IF YOU CHECKED OFF ANY PROBLEMS ON THIS QUESTIONNAIRE, HOW DIFFICULT HAVE THESE PROBLEMS MADE IT FOR YOU TO DO YOUR WORK, TAKE CARE OF THINGS AT HOME, OR GET ALONG WITH OTHER PEOPLE: NOT DIFFICULT AT ALL
4. TROUBLE RELAXING: 0
GAD7 TOTAL SCORE: 1
7. FEELING AFRAID AS IF SOMETHING AWFUL MIGHT HAPPEN: 0
2. NOT BEING ABLE TO STOP OR CONTROL WORRYING: 0
1. FEELING NERVOUS, ANXIOUS, OR ON EDGE: 0

## 2022-08-10 ASSESSMENT — ENCOUNTER SYMPTOMS
VOMITING: 0
SORE THROAT: 0
NAUSEA: 0
DIARRHEA: 0
CONSTIPATION: 0
RHINORRHEA: 0
COUGH: 0
ABDOMINAL PAIN: 1
SHORTNESS OF BREATH: 0

## 2022-08-10 ASSESSMENT — PATIENT HEALTH QUESTIONNAIRE - PHQ9
SUM OF ALL RESPONSES TO PHQ QUESTIONS 1-9: 0
SUM OF ALL RESPONSES TO PHQ9 QUESTIONS 1 & 2: 0
2. FEELING DOWN, DEPRESSED OR HOPELESS: 0
1. LITTLE INTEREST OR PLEASURE IN DOING THINGS: 0
SUM OF ALL RESPONSES TO PHQ QUESTIONS 1-9: 0

## 2022-08-10 NOTE — PROGRESS NOTES
[address] [facility]   [address]  [phone]    8/10/2022     CHIEF COMPLAINT:     Nik Buckner (:  1980) is a 40 y.o. female, here for evaluation of the following chief complaint(s):  Pelvic Pain (Pt has had the pelvic pain since November, she has had a vaginal US in November, Pravirginiair said she needed to f/u with PCP because the pain has not went away, pt cant stand or sit for long periods of time without pain)      REVIEWED INFORMATION      Allergies   Allergen Reactions    Prednisone     Codeine     Darvocet A500 [Propoxyphene N-Acetaminophen]        Current Outpatient Medications   Medication Sig Dispense Refill    omeprazole (PRILOSEC) 20 MG delayed release capsule Take 1 capsule by mouth in the morning and 1 capsule before bedtime. 180 capsule 3    fluticasone (FLONASE) 50 MCG/ACT nasal spray 2 sprays by Each Nostril route daily 48 g 1    enalapril (VASOTEC) 10 MG tablet Take 1 tablet by mouth daily 90 tablet 3    montelukast (SINGULAIR) 10 MG tablet Take 1 tablet by mouth nightly 90 tablet 3    Drospirenone 4 MG TABS Take by mouth      cetirizine (ZYRTEC) 10 MG tablet Take 10 mg by mouth daily      zinc 50 MG TABS tablet Take 50 mg by mouth daily otc      glucosamine-chondroitin 500-400 MG CAPS Take by mouth daily      magnesium (MAGNESIUM-OXIDE) 250 MG TABS tablet Take 250 mg by mouth daily      amLODIPine (NORVASC) 5 MG tablet TAKE 1 TABLET BY MOUTH DAILY 90 tablet 3    Ascorbic Acid (VITAMIN C) 250 MG tablet Take 500 mg by mouth daily        No current facility-administered medications for this visit.         Patient Care Team:  TISH Vaughn CNP as PCP - General (Nurse Practitioner)  TISH Vaughn CNP as PCP - [facility]  Yasmine Parehk MD as Consulting Physician (Nephrology)  Kaya Jones MD (Obstetrics & Gynecology)    REVIEW OF SYSTEMS:     Review of Systems   Constitutional:  Negative for activity change, fatigue and unexpected weight change. HENT:  Negative for congestion, ear pain, hearing loss, rhinorrhea and sore throat. Respiratory:  Negative for cough and shortness of breath. Cardiovascular:  Negative for chest pain, palpitations and leg swelling. Gastrointestinal:  Positive for abdominal pain. Negative for constipation, diarrhea, nausea and vomiting. Musculoskeletal:  Negative for arthralgias and gait problem. Neurological:  Negative for dizziness, weakness and headaches. Psychiatric/Behavioral:  Positive for sleep disturbance. Negative for confusion. The patient is not nervous/anxious. HISTORY OF PRESENT ILLNESS     Patient presents today for complaint of lower abdominal/pelvic pain. Patient reports pain has been present for over 6 months. Reports difficulty sleeping and laying in certain positions. Patient is present with certain positions, there does not appear to be anything it improves it. Reports that she has loose frequent stools through the day. Has noticed some mild blood smearing, but related to hemorrhoids. Denies any dark stools. Does report loose stools, with a shiny appearance at times. Patient does not notice any significant difference with any certain foods. She does have a history of reflux, for which she has been treated with omeprazole twice daily for several years. Denies any nausea, or vomiting. Denies any fever, or chills. Review of patient's labs show that patient has a two-point drop in her hemoglobin. She does have a history of chronic kidney disease which showing an elevation in her kidney function at this time. As hemoglobin may be related to her kidney disease, she denies any dark stools as noted before. And she reports only small amounts of blood when wiping,. She denies any shortness of breath, or excessive fatigue. I still have concerns in regards to the two-point drop.   With patient's symptoms we are going to complete testing today and I will refer patient to GI for colonoscopy. PHYSICAL EXAM:     /70 (Site: Left Upper Arm, Position: Sitting, Cuff Size: Large Adult)   Pulse (!) 102   Temp 97.4 °F (36.3 °C) (Tympanic)   Ht 5' 5\" (1.651 m)   Wt 208 lb (94.3 kg)   SpO2 99%   BMI 34.61 kg/m²      Physical Exam  Vitals reviewed. Constitutional:       Appearance: Normal appearance. She is not ill-appearing. Cardiovascular:      Rate and Rhythm: Normal rate and regular rhythm. Heart sounds: No murmur heard. No friction rub. No gallop. Pulmonary:      Effort: Pulmonary effort is normal. No respiratory distress. Breath sounds: No wheezing. Abdominal:      General: Abdomen is protuberant. Bowel sounds are normal.      Palpations: Abdomen is soft. Tenderness: There is abdominal tenderness in the right lower quadrant and left lower quadrant. Hernia: No hernia is present. There is no hernia in the umbilical area. Musculoskeletal:      Right lower leg: No edema. Left lower leg: No edema. Skin:     General: Skin is warm. Findings: No erythema, lesion or rash. Neurological:      Mental Status: She is alert. Psychiatric:         Mood and Affect: Mood normal.         Behavior: Behavior is cooperative. PROCEDURE/ IN OFFICE TESTING/ LAB REVIEW     No in office testing or procedures completed during today's office visit. ASSESSMENT/PLAN/ FOLLOWUP:     PLEASE NOTE THAT ANY DISCONTINUATION OF MEDICATIONS OR MEDICAL SUPPLIES REFLECTED IN TODAY'S VISIT SUMMARY  MAY NOT HAVE COMPLETED AS A CHANGE IN YOUR PLAN OF CARE. THESE CHANGES MAY HAVE ONLY BEEN DONE SO IN ORDER TO CLEAN UP LIST FROM DUPLICATIONS OR MISCELLANEOUS SUPPLIES ONLY NEEDED PERIODIC REORDERS. DO NOT DISCONTINUE MEDICATIONS LISTED UNLESS SPECIFICALLY DISCUSSED IN YOUR APPOINTMENT WITH PROVIDER OR SPECIALIST, IF YOU HAVE AN QUESTIONS, PLEASE CONTACT YOUR PROVIDER FOR CLARIFICATION IF NOT ADDRESSED IN YOUR PLAN OF CARE.      1. Low hemoglobin  -     Ayaz Ortiz MD, Gastroenterology, [place]  -     CBC; Future  -     CT ABDOMEN PELVIS WO CONTRAST Additional Contrast? None; Future  2. Gastroesophageal reflux disease without esophagitis  -     omeprazole (PRILOSEC) 20 MG delayed release capsule; Take 1 capsule by mouth in the morning and 1 capsule before bedtime. , Disp-180 capsule, R-3Normal  3. Lower abdominal pain  -     Comprehensive Metabolic Panel, Fasting; Future  -     Amylase; Future  -     Lipase; Future  -     Sedimentation Rate; Future  -     C-Reactive Protein; Future  -     Calprotectin Stool; Future  -     Ayaz Ortiz MD, Gastroenterology, [place]  -     CT ABDOMEN PELVIS WO CONTRAST Additional Contrast? None; Future  4. Loose stools  -     Comprehensive Metabolic Panel, Fasting; Future  -     Amylase; Future  -     Lipase; Future  -     Sedimentation Rate; Future  -     C-Reactive Protein; Future  -     Calprotectin Stool; Future  -     Ayaz Ortiz MD, Gastroenterology, [place]  -     CT ABDOMEN PELVIS WO CONTRAST Additional Contrast? None; Future      Return if symptoms worsen or fail to improve pending testing results. COMMUNICATION:       On this date 8/10/2022 I have spent 30 minutes reviewing previous notes, test results and face to face with the patient discussing the diagnosis and importance of compliance with the treatment plan as well as documenting on the day of the visit. The best way to find yourself is to lose yourself in the service of others - [address] [place] [address]. [facility]   [email or web address]  Office: [phone]     An electronic signature was used to authenticate this note.   Signed by TISH Rubio CNP, APRN-CNP on 8/10/2022 at 10:47 AM

## 2022-08-12 ENCOUNTER — HOSPITAL ENCOUNTER (OUTPATIENT)
Age: 42
Setting detail: SPECIMEN
Discharge: HOME | End: 2022-08-12

## 2022-08-12 DIAGNOSIS — R10.30 LOWER ABDOMINAL PAIN: ICD-10-CM

## 2022-08-12 DIAGNOSIS — D64.9 LOW HEMOGLOBIN: ICD-10-CM

## 2022-08-12 DIAGNOSIS — R19.5 LOOSE STOOLS: ICD-10-CM

## 2022-08-12 LAB
ALBUMIN SERPL-MCNC: 4.1 G/DL (ref 3.5–5.2)
ALBUMIN/GLOBULIN RATIO: 1.7 (ref 1–2.5)
ALP BLD-CCNC: 109 U/L (ref 35–104)
ALT SERPL-CCNC: 19 U/L (ref 5–33)
AMYLASE: 81 U/L (ref 28–100)
ANION GAP SERPL CALCULATED.3IONS-SCNC: 16 MMOL/L (ref 9–17)
AST SERPL-CCNC: 17 U/L
BILIRUB SERPL-MCNC: 0.18 MG/DL (ref 0.3–1.2)
BUN BLDV-MCNC: 29 MG/DL (ref 6–20)
C-REACTIVE PROTEIN: 3.9 MG/L (ref 0–5)
CALCIUM SERPL-MCNC: 9.5 MG/DL (ref 8.6–10.4)
CHLORIDE BLD-SCNC: 111 MMOL/L (ref 98–107)
CO2: 17 MMOL/L (ref 20–31)
CREAT SERPL-MCNC: 2.15 MG/DL (ref 0.5–0.9)
GFR AFRICAN AMERICAN: 31 ML/MIN
GFR NON-AFRICAN AMERICAN: 25 ML/MIN
GFR SERPL CREATININE-BSD FRML MDRD: ABNORMAL ML/MIN/{1.73_M2}
GLUCOSE FASTING: 75 MG/DL (ref 70–99)
HCT VFR BLD CALC: 37 % (ref 36.3–47.1)
HEMOGLOBIN: 11.2 G/DL (ref 11.9–15.1)
LIPASE: 79 U/L (ref 13–60)
MCH RBC QN AUTO: 29.9 PG (ref 25.2–33.5)
MCHC RBC AUTO-ENTMCNC: 30.3 G/DL (ref 28.4–34.8)
MCV RBC AUTO: 98.9 FL (ref 82.6–102.9)
NRBC AUTOMATED: 0 PER 100 WBC
PDW BLD-RTO: 13.4 % (ref 11.8–14.4)
PLATELET # BLD: 280 K/UL (ref 138–453)
PMV BLD AUTO: 10.8 FL (ref 8.1–13.5)
POTASSIUM SERPL-SCNC: 5.3 MMOL/L (ref 3.7–5.3)
RBC # BLD: 3.74 M/UL (ref 3.95–5.11)
SEDIMENTATION RATE, ERYTHROCYTE: 2 MM/HR (ref 0–20)
SODIUM BLD-SCNC: 144 MMOL/L (ref 135–144)
TOTAL PROTEIN: 6.5 G/DL (ref 6.4–8.3)
WBC # BLD: 9.2 K/UL (ref 3.5–11.3)

## 2022-08-15 ENCOUNTER — HOSPITAL ENCOUNTER (OUTPATIENT)
Age: 42
Setting detail: SPECIMEN
Discharge: HOME | End: 2022-08-15

## 2022-08-15 ENCOUNTER — HOSPITAL ENCOUNTER (OUTPATIENT)
Dept: CT IMAGING | Facility: CLINIC | Age: 42
Discharge: HOME | End: 2022-08-17
Payer: COMMERCIAL

## 2022-08-15 DIAGNOSIS — D64.9 LOW HEMOGLOBIN: ICD-10-CM

## 2022-08-15 DIAGNOSIS — R10.30 LOWER ABDOMINAL PAIN: ICD-10-CM

## 2022-08-15 DIAGNOSIS — R19.5 LOOSE STOOLS: ICD-10-CM

## 2022-08-15 PROCEDURE — 74176 CT ABD & PELVIS W/O CONTRAST: CPT

## 2022-08-17 ENCOUNTER — TELEPHONE (OUTPATIENT)
Dept: FAMILY MEDICINE CLINIC | Age: 42
End: 2022-08-17

## 2022-08-17 NOTE — TELEPHONE ENCOUNTER
A fax was received for a notice of denial of medical services/items. The order that has been denied is the CT abdomen and Pelvis due to they need the patients labs or other tests supporting why this is needed. Labs were faxed back to [facility].

## 2022-08-18 LAB — CALPROTECTIN, FECAL: 71 UG/G

## 2022-08-29 NOTE — TELEPHONE ENCOUNTER
A fax was received from [facility] denying the CT abdomen and pelvis without contrast. Requesting more information regarding the labs from the stool and urine. Results faxed back.

## 2022-10-03 ENCOUNTER — OFFICE VISIT (OUTPATIENT)
Dept: GASTROENTEROLOGY | Age: 42
End: 2022-10-03
Payer: COMMERCIAL

## 2022-10-03 VITALS
SYSTOLIC BLOOD PRESSURE: 124 MMHG | WEIGHT: 211 LBS | DIASTOLIC BLOOD PRESSURE: 74 MMHG | HEART RATE: 75 BPM | BODY MASS INDEX: 35.11 KG/M2 | TEMPERATURE: 97.9 F

## 2022-10-03 DIAGNOSIS — R19.5 LOOSE STOOLS: Primary | ICD-10-CM

## 2022-10-03 DIAGNOSIS — R10.32 ABDOMINAL PAIN, LEFT LOWER QUADRANT: ICD-10-CM

## 2022-10-03 DIAGNOSIS — D50.0 IRON DEFICIENCY ANEMIA DUE TO CHRONIC BLOOD LOSS: ICD-10-CM

## 2022-10-03 DIAGNOSIS — K62.5 HEMORRHAGE OF RECTUM AND ANUS: ICD-10-CM

## 2022-10-03 PROCEDURE — G8417 CALC BMI ABV UP PARAM F/U: HCPCS | Performed by: INTERNAL MEDICINE

## 2022-10-03 PROCEDURE — 4004F PT TOBACCO SCREEN RCVD TLK: CPT | Performed by: INTERNAL MEDICINE

## 2022-10-03 PROCEDURE — G8484 FLU IMMUNIZE NO ADMIN: HCPCS | Performed by: INTERNAL MEDICINE

## 2022-10-03 PROCEDURE — 99204 OFFICE O/P NEW MOD 45 MIN: CPT | Performed by: INTERNAL MEDICINE

## 2022-10-03 PROCEDURE — G8427 DOCREV CUR MEDS BY ELIG CLIN: HCPCS | Performed by: INTERNAL MEDICINE

## 2022-10-03 ASSESSMENT — ENCOUNTER SYMPTOMS
CHOKING: 0
RECTAL PAIN: 0
VOMITING: 0
COUGH: 0
DIARRHEA: 1
ANAL BLEEDING: 0
ABDOMINAL DISTENTION: 0
CONSTIPATION: 0
SHORTNESS OF BREATH: 0
ABDOMINAL PAIN: 0
NAUSEA: 0
WHEEZING: 0
BLOOD IN STOOL: 1
TROUBLE SWALLOWING: 0

## 2022-10-03 NOTE — PROGRESS NOTES
Reason for Referral:   Camron Moreau, APRN - CNP  [facility],  [address]    Chief Complaint   Patient presents with    Anemia     Patient is new, referred for low hgb and loose stool    Diarrhea     Patient c/o loose stool 5-8 times daily. Most BM are small. HISTORY OF PRESENT ILLNESS: Humaira Michel is a 40 y.o. female , referred for evaluation of anemia, diarrhea    Here for the first time    Lower abd pain for few months both sides, when asked she did admit to   Frequent loose stool since , not getting better   Blood in the stool  Anemia   , no periods for 1 year because of BCP  The pain has resolved and right now is just left with a loose stool  Been at her labs she is anemic  But she denied any sign of bleeding  She takes Prilosec 20 mg for her acid reflux and has not been a problem at all      Past Medical,Family, and Social History reviewed and does contribute to the patient presentingcondition. Patient's PMH/PSH,SH,PSYCH Hx, MEDs, ALLERGIES, and ROS were all reviewed and updated in the appropriate sections. PAST MEDICAL HISTORY:  Past Medical History:   Diagnosis Date    Chronic kidney disease     FSGS (focal segmental glomerulosclerosis)     GERD (gastroesophageal reflux disease)     Hypertension     Proteinuria        Past Surgical History:   Procedure Laterality Date    KNEE ARTHROPLASTY      both acl and meniscus completed in high school     KNEE SURGERY      NASAL SEPTUM SURGERY  04/13/2012       CURRENT MEDICATIONS:    Current Outpatient Medications:     omeprazole (PRILOSEC) 20 MG delayed release capsule, Take 1 capsule by mouth in the morning and 1 capsule before bedtime. , Disp: 180 capsule, Rfl: 3    fluticasone (FLONASE) 50 MCG/ACT nasal spray, 2 sprays by Each Nostril route daily, Disp: 48 g, Rfl: 1    enalapril (VASOTEC) 10 MG tablet, Take 1 tablet by mouth daily, Disp: 90 tablet, Rfl: 3    montelukast (SINGULAIR) 10 MG tablet, Take 1 tablet by true    Ran Out of Food in the Last Year: Never true   Transportation Needs: Not on file   Physical Activity: Not on file   Stress: Not on file   Social Connections: Not on file   Intimate Partner Violence: Not on file   Housing Stability: Not on file       REVIEW OF SYSTEMS: A 12-point review of systemswas obtained and pertinent positives and negatives were enumerated above in the history of present illness. All other reviewed systems / symptoms were negative. Review of Systems   Constitutional:  Negative for appetite change, fatigue and unexpected weight change. HENT:  Negative for trouble swallowing. Respiratory:  Negative for cough, choking, shortness of breath and wheezing. Cardiovascular:  Negative for chest pain, palpitations and leg swelling. Gastrointestinal:  Positive for blood in stool and diarrhea. Negative for abdominal distention, abdominal pain, anal bleeding, constipation, nausea, rectal pain and vomiting. Genitourinary:  Negative for difficulty urinating. Allergic/Immunologic: Negative for environmental allergies and food allergies. Neurological:  Negative for dizziness, weakness, light-headedness, numbness and headaches. Hematological:  Does not bruise/bleed easily. Psychiatric/Behavioral:  Negative for sleep disturbance. The patient is not nervous/anxious.           LABORATORY DATA: Reviewed  Lab Results   Component Value Date    WBC 9.2 08/12/2022    HGB 11.2 (L) 08/12/2022    HCT 37.0 08/12/2022    MCV 98.9 08/12/2022     08/12/2022     08/12/2022    K 5.3 08/12/2022     (H) 08/12/2022    CO2 17 (L) 08/12/2022    BUN 29 (H) 08/12/2022    CREATININE 2.15 (H) 08/12/2022    LABALBU 4.1 08/12/2022    BILITOT 0.18 (L) 08/12/2022    ALKPHOS 109 (H) 08/12/2022    AST 17 08/12/2022    ALT 19 08/12/2022         Lab Results   Component Value Date    RBC 3.74 (L) 08/12/2022    HGB 11.2 (L) 08/12/2022    MCV 98.9 08/12/2022    MCH 29.9 08/12/2022    MCHC 30.3 08/12/2022    RDW 13.4 08/12/2022    MPV 10.8 08/12/2022    BASOPCT 1 02/09/2022    LYMPHSABS 2.92 02/09/2022    MONOSABS 0.71 02/09/2022    NEUTROABS 4.66 02/09/2022    EOSABS 0.09 02/09/2022    BASOSABS 0.11 02/09/2022         DIAGNOSTIC TESTING:     No results found. /74   Pulse 75   Temp 97.9 °F (36.6 °C)   Wt 211 lb (95.7 kg)   BMI 35.11 kg/m²     PHYSICAL EXAMINATION: Vital signs reviewed per the nursing documentation. Body mass index is 35.11 kg/m². Physical Exam  Vitals and nursing note reviewed. Constitutional:       General: She is not in acute distress. Appearance: She is well-developed. She is not diaphoretic. HENT:      Head: Normocephalic. Mouth/Throat:      Pharynx: No oropharyngeal exudate. Eyes:      General: No scleral icterus. Pupils: Pupils are equal, round, and reactive to light. Neck:      Thyroid: No thyromegaly. Vascular: No JVD. Trachea: No tracheal deviation. Cardiovascular:      Rate and Rhythm: Normal rate and regular rhythm. Heart sounds: Normal heart sounds. No murmur heard. Pulmonary:      Effort: Pulmonary effort is normal. No respiratory distress. Breath sounds: Normal breath sounds. No wheezing. Abdominal:      General: Bowel sounds are normal. There is no distension. Palpations: Abdomen is soft. Tenderness: There is no abdominal tenderness. There is no guarding or rebound. Comments: No ascites   Musculoskeletal:         General: Normal range of motion. Cervical back: Normal range of motion and neck supple. Skin:     General: Skin is warm. Coloration: Skin is not pale. Findings: No erythema or rash. Comments: She is not diaphoretic   Neurological:      Mental Status: She is alert and oriented to person, place, and time. Deep Tendon Reflexes: Reflexes are normal and symmetric. Psychiatric:         Behavior: Behavior normal.         Thought Content:  Thought content normal. Judgment: Judgment normal.       IMPRESSION: Ms. Rose Marie Rangel is a 40 y.o. female with      Diagnosis Orders   1. Loose stools  COLONOSCOPY W/ OR W/O BIOPSY    EGD      2. Abdominal pain, left lower quadrant  COLONOSCOPY W/ OR W/O BIOPSY    EGD      3. Iron deficiency anemia due to chronic blood loss  Celiac Disease Panel    Iron and TIBC    Vitamin B12    Folate    COLONOSCOPY W/ OR W/O BIOPSY    EGD      4. Hemorrhage of rectum and anus  COLONOSCOPY W/ OR W/O BIOPSY        Will proceed with the above  Continue with the Prilosec for now      Diet/life style/natural hx /complication of the dx were all explained in details   Past medical, past surgical, social history, psychiatric history, medications or allergies, all reviewed and  updated        Thank you for allowing me to participate in the care of Ms. Rose Marie Rangel. For any further questions please do not hesitate to contact me. I have reviewed and agree with the MA/GERHARD ROS. Note is dictated utilizing voice recognition software. Unfortunately this leads to occasional typographical errors. Please contact our office if you have any questions.     Marci Tan MD  [facility]  O: #[phone]

## 2022-10-04 RX ORDER — BISACODYL 5 MG
TABLET, DELAYED RELEASE (ENTERIC COATED) ORAL
Qty: 4 TABLET | Refills: 0 | Status: SHIPPED | OUTPATIENT
Start: 2022-10-04

## 2022-10-04 RX ORDER — POLYETHYLENE GLYCOL 3350, SODIUM SULFATE ANHYDROUS, SODIUM BICARBONATE, SODIUM CHLORIDE, POTASSIUM CHLORIDE 236; 22.74; 6.74; 5.86; 2.97 G/4L; G/4L; G/4L; G/4L; G/4L
4000 POWDER, FOR SOLUTION ORAL ONCE
Qty: 4000 ML | Refills: 0 | Status: SHIPPED | OUTPATIENT
Start: 2022-10-04 | End: 2022-10-04

## 2022-10-04 RX ORDER — SODIUM, POTASSIUM,MAG SULFATES 17.5-3.13G
SOLUTION, RECONSTITUTED, ORAL ORAL
Qty: 1 EACH | Refills: 0 | Status: SHIPPED | OUTPATIENT
Start: 2022-10-04

## 2022-10-04 RX ORDER — POLYETHYLENE GLYCOL 3350, SODIUM SULFATE ANHYDROUS, SODIUM BICARBONATE, SODIUM CHLORIDE, POTASSIUM CHLORIDE 236; 22.74; 6.74; 5.86; 2.97 G/4L; G/4L; G/4L; G/4L; G/4L
POWDER, FOR SOLUTION ORAL
Qty: 4000 ML | Refills: 0 | Status: CANCELLED | OUTPATIENT
Start: 2022-10-04

## 2022-10-21 ENCOUNTER — HOSPITAL ENCOUNTER (OUTPATIENT)
Age: 42
Setting detail: OUTPATIENT SURGERY
Discharge: HOME | End: 2022-10-21
Attending: INTERNAL MEDICINE | Admitting: INTERNAL MEDICINE
Payer: COMMERCIAL

## 2022-10-21 ENCOUNTER — ANESTHESIA (OUTPATIENT)
Dept: OPERATING ROOM | Age: 42
End: 2022-10-21
Payer: COMMERCIAL

## 2022-10-21 ENCOUNTER — ANESTHESIA EVENT (OUTPATIENT)
Dept: OPERATING ROOM | Age: 42
End: 2022-10-21
Payer: COMMERCIAL

## 2022-10-21 VITALS
BODY MASS INDEX: 34.99 KG/M2 | RESPIRATION RATE: 17 BRPM | WEIGHT: 210 LBS | DIASTOLIC BLOOD PRESSURE: 85 MMHG | HEIGHT: 65 IN | TEMPERATURE: 97.9 F | OXYGEN SATURATION: 100 % | HEART RATE: 72 BPM | SYSTOLIC BLOOD PRESSURE: 126 MMHG

## 2022-10-21 DIAGNOSIS — R19.5 LOOSE STOOLS: ICD-10-CM

## 2022-10-21 DIAGNOSIS — R10.9 ABDOMINAL PAIN, UNSPECIFIED ABDOMINAL LOCATION: ICD-10-CM

## 2022-10-21 DIAGNOSIS — K62.5 RECTAL BLEEDING: ICD-10-CM

## 2022-10-21 LAB — HCG, PREGNANCY URINE (POC): NEGATIVE

## 2022-10-21 PROCEDURE — 88305 TISSUE EXAM BY PATHOLOGIST: CPT

## 2022-10-21 PROCEDURE — 3609012400 HC EGD TRANSORAL BIOPSY SINGLE/MULTIPLE: Performed by: INTERNAL MEDICINE

## 2022-10-21 PROCEDURE — 43239 EGD BIOPSY SINGLE/MULTIPLE: CPT | Performed by: INTERNAL MEDICINE

## 2022-10-21 PROCEDURE — 7100000011 HC PHASE II RECOVERY - ADDTL 15 MIN: Performed by: INTERNAL MEDICINE

## 2022-10-21 PROCEDURE — 45380 COLONOSCOPY AND BIOPSY: CPT | Performed by: INTERNAL MEDICINE

## 2022-10-21 PROCEDURE — 88342 IMHCHEM/IMCYTCHM 1ST ANTB: CPT

## 2022-10-21 PROCEDURE — 7100000010 HC PHASE II RECOVERY - FIRST 15 MIN: Performed by: INTERNAL MEDICINE

## 2022-10-21 PROCEDURE — 2580000003 HC RX 258: Performed by: ANESTHESIOLOGY

## 2022-10-21 PROCEDURE — 81025 URINE PREGNANCY TEST: CPT

## 2022-10-21 PROCEDURE — 3609010300 HC COLONOSCOPY W/BIOPSY SINGLE/MULTIPLE: Performed by: INTERNAL MEDICINE

## 2022-10-21 PROCEDURE — 3700000001 HC ADD 15 MINUTES (ANESTHESIA): Performed by: INTERNAL MEDICINE

## 2022-10-21 PROCEDURE — 3700000000 HC ANESTHESIA ATTENDED CARE: Performed by: INTERNAL MEDICINE

## 2022-10-21 PROCEDURE — 2500000003 HC RX 250 WO HCPCS: Performed by: NURSE ANESTHETIST, CERTIFIED REGISTERED

## 2022-10-21 PROCEDURE — 2709999900 HC NON-CHARGEABLE SUPPLY: Performed by: INTERNAL MEDICINE

## 2022-10-21 PROCEDURE — 6360000002 HC RX W HCPCS: Performed by: NURSE ANESTHETIST, CERTIFIED REGISTERED

## 2022-10-21 RX ORDER — SODIUM CHLORIDE, SODIUM LACTATE, POTASSIUM CHLORIDE, CALCIUM CHLORIDE 600; 310; 30; 20 MG/100ML; MG/100ML; MG/100ML; MG/100ML
INJECTION, SOLUTION INTRAVENOUS CONTINUOUS
Status: DISCONTINUED | OUTPATIENT
Start: 2022-10-21 | End: 2022-10-21 | Stop reason: HOSPADM

## 2022-10-21 RX ORDER — PROPOFOL 10 MG/ML
INJECTION, EMULSION INTRAVENOUS PRN
Status: DISCONTINUED | OUTPATIENT
Start: 2022-10-21 | End: 2022-10-21 | Stop reason: SDUPTHER

## 2022-10-21 RX ORDER — SODIUM CHLORIDE 9 MG/ML
INJECTION, SOLUTION INTRAVENOUS CONTINUOUS
Status: DISCONTINUED | OUTPATIENT
Start: 2022-10-21 | End: 2022-10-21 | Stop reason: HOSPADM

## 2022-10-21 RX ORDER — LIDOCAINE HYDROCHLORIDE 20 MG/ML
INJECTION, SOLUTION EPIDURAL; INFILTRATION; INTRACAUDAL; PERINEURAL PRN
Status: DISCONTINUED | OUTPATIENT
Start: 2022-10-21 | End: 2022-10-21 | Stop reason: SDUPTHER

## 2022-10-21 RX ORDER — SODIUM CHLORIDE 9 MG/ML
INJECTION, SOLUTION INTRAVENOUS PRN
Status: DISCONTINUED | OUTPATIENT
Start: 2022-10-21 | End: 2022-10-21 | Stop reason: HOSPADM

## 2022-10-21 RX ORDER — SODIUM CHLORIDE 0.9 % (FLUSH) 0.9 %
5-40 SYRINGE (ML) INJECTION PRN
Status: DISCONTINUED | OUTPATIENT
Start: 2022-10-21 | End: 2022-10-21 | Stop reason: HOSPADM

## 2022-10-21 RX ORDER — LIDOCAINE HYDROCHLORIDE 10 MG/ML
1 INJECTION, SOLUTION EPIDURAL; INFILTRATION; INTRACAUDAL; PERINEURAL
Status: DISCONTINUED | OUTPATIENT
Start: 2022-10-21 | End: 2022-10-21 | Stop reason: HOSPADM

## 2022-10-21 RX ORDER — SODIUM CHLORIDE 0.9 % (FLUSH) 0.9 %
5-40 SYRINGE (ML) INJECTION EVERY 12 HOURS SCHEDULED
Status: DISCONTINUED | OUTPATIENT
Start: 2022-10-21 | End: 2022-10-21 | Stop reason: HOSPADM

## 2022-10-21 RX ADMIN — PROPOFOL 75 MG: 10 INJECTION, EMULSION INTRAVENOUS at 07:57

## 2022-10-21 RX ADMIN — PROPOFOL 50 MG: 10 INJECTION, EMULSION INTRAVENOUS at 08:03

## 2022-10-21 RX ADMIN — PROPOFOL 50 MG: 10 INJECTION, EMULSION INTRAVENOUS at 08:16

## 2022-10-21 RX ADMIN — PROPOFOL 50 MG: 10 INJECTION, EMULSION INTRAVENOUS at 08:05

## 2022-10-21 RX ADMIN — LIDOCAINE HYDROCHLORIDE 100 MG: 20 INJECTION, SOLUTION EPIDURAL; INFILTRATION; INTRACAUDAL; PERINEURAL at 07:52

## 2022-10-21 RX ADMIN — PROPOFOL 50 MG: 10 INJECTION, EMULSION INTRAVENOUS at 08:11

## 2022-10-21 RX ADMIN — PROPOFOL 75 MG: 10 INJECTION, EMULSION INTRAVENOUS at 08:00

## 2022-10-21 RX ADMIN — PROPOFOL 100 MG: 10 INJECTION, EMULSION INTRAVENOUS at 07:52

## 2022-10-21 RX ADMIN — SODIUM CHLORIDE, POTASSIUM CHLORIDE, SODIUM LACTATE AND CALCIUM CHLORIDE: 600; 310; 30; 20 INJECTION, SOLUTION INTRAVENOUS at 07:16

## 2022-10-21 ASSESSMENT — PAIN - FUNCTIONAL ASSESSMENT: PAIN_FUNCTIONAL_ASSESSMENT: 0-10

## 2022-10-21 NOTE — ANESTHESIA PRE PROCEDURE
Department of Anesthesiology  Preprocedure Note       Name:  Nuzhat Reinoso   Age:  40 y.o.  :  1980                                          MRN:  [id number]         Date:  10/21/2022      Surgeon: Bonnie Pinto):  Ramirez Weaver MD    Procedure: Procedure(s):  COLONOSCOPY DIAGNOSTIC  EGD ESOPHAGOGASTRODUODENOSCOPY    Medications prior to admission:   Prior to Admission medications    Medication Sig Start Date End Date Taking? Authorizing Provider   Na Sulfate-K Sulfate-Mg Sulf (SUPREP BOWEL PREP KIT) 17.5-3.13-1.6 GM/177ML SOLN solution Please follow instructions given to you by your provider 10/4/22   Ramirez Weaver MD   bisacodyl 5 MG EC tablet Please follow instructions given to you by your provider 10/4/22   Ramirez Weaver MD   omeprazole (PRILOSEC) 20 MG delayed release capsule Take 1 capsule by mouth in the morning and 1 capsule before bedtime.  8/10/22 8/10/23  TISH Daniel CNP   fluticasone (FLONASE) 50 MCG/ACT nasal spray 2 sprays by Each Nostril route daily 22   TISH Daniel CNP   enalapril (VASOTEC) 10 MG tablet Take 1 tablet by mouth daily 22   TISH Daniel CNP   montelukast (SINGULAIR) 10 MG tablet Take 1 tablet by mouth nightly 22   TISH Daniel CNP   Drospirenone 4 MG TABS Take by mouth    Historical Provider, MD   cetirizine (ZYRTEC) 10 MG tablet Take 10 mg by mouth daily    Historical Provider, MD   zinc 50 MG TABS tablet Take 50 mg by mouth daily otc    Historical Provider, MD   glucosamine-chondroitin 500-400 MG CAPS Take by mouth daily    Historical Provider, MD   magnesium (MAGNESIUM-OXIDE) 250 MG TABS tablet Take 250 mg by mouth daily    Historical Provider, MD   amLODIPine (NORVASC) 5 MG tablet TAKE 1 TABLET BY MOUTH DAILY 18   David Condon MD   Ascorbic Acid (VITAMIN C) 250 MG tablet Take 500 mg by mouth daily     Historical Provider, MD       Current medications:    No current facility-administered medications for this encounter. Allergies:     Allergies   Allergen Reactions    Prednisone     Codeine     [de-identified] A500 [Propoxyphene N-Acetaminophen]        Problem List:    Patient Active Problem List   Diagnosis Code    CKD (chronic kidney disease) stage 3, GFR 30-59 ml/min ([facility]) N18.30    FSGS (focal segmental glomerulosclerosis) N05.1    Proteinuria R80.9    Essential hypertension I10    Gastroesophageal reflux disease without esophagitis K21.9    Seasonal allergies J30.2       Past Medical History:        Diagnosis Date    Chronic kidney disease     FSGS (focal segmental glomerulosclerosis)     GERD (gastroesophageal reflux disease)     Hypertension     Proteinuria        Past Surgical History:        Procedure Laterality Date    KNEE ARTHROPLASTY      both acl and meniscus completed in high school     KNEE SURGERY      NASAL SEPTUM SURGERY  2012       Social History:    Social History     Tobacco Use    Smoking status: Some Days     Packs/day: 0.50     Years: 26.00     Pack years: 13.00     Types: Cigarettes     Start date: 1996     Last attempt to quit: 2022     Years since quittin.3    Smokeless tobacco: Never   Substance Use Topics    Alcohol use: Yes     Comment: occ                                Ready to quit: Not Answered  Counseling given: Not Answered      Vital Signs (Current):   Vitals:    10/21/22 0654   BP: (!) 155/88   Pulse: 89   Resp: 20   Temp: 97.8 °F (36.6 °C)   TempSrc: Temporal   SpO2: 97%                                              BP Readings from Last 3 Encounters:   10/21/22 (!) 155/88   10/03/22 124/74   08/10/22 110/70       NPO Status:                                                                                 BMI:   Wt Readings from Last 3 Encounters:   10/03/22 211 lb (95.7 kg)   08/10/22 208 lb (94.3 kg)   22 205 lb 12.8 oz (93.4 kg)     There is no height or weight on file to calculate BMI.    CBC:   Lab Results   Component Value Date/Time WBC 9.2 08/12/2022 07:32 AM    RBC 3.74 08/12/2022 07:32 AM    HGB 11.2 08/12/2022 07:32 AM    HCT 37.0 08/12/2022 07:32 AM    MCV 98.9 08/12/2022 07:32 AM    RDW 13.4 08/12/2022 07:32 AM     08/12/2022 07:32 AM       CMP:   Lab Results   Component Value Date/Time     08/12/2022 07:32 AM    K 5.3 08/12/2022 07:32 AM     08/12/2022 07:32 AM    CO2 17 08/12/2022 07:32 AM    BUN 29 08/12/2022 07:32 AM    CREATININE 2.15 08/12/2022 07:32 AM    GFRAA 31 08/12/2022 07:32 AM    LABGLOM 25 08/12/2022 07:32 AM    GLUCOSE 84 02/09/2022 04:20 PM    PROT 6.5 08/12/2022 07:32 AM    CALCIUM 9.5 08/12/2022 07:32 AM    BILITOT 0.18 08/12/2022 07:32 AM    ALKPHOS 109 08/12/2022 07:32 AM    AST 17 08/12/2022 07:32 AM    ALT 19 08/12/2022 07:32 AM       POC Tests: No results for input(s): POCGLU, POCNA, POCK, POCCL, POCBUN, POCHEMO, POCHCT in the last 72 hours. Coags: No results found for: PROTIME, INR, APTT    HCG (If Applicable):   Lab Results   Component Value Date    HCG NEGATIVE 10/21/2022        ABGs: No results found for: PHART, PO2ART, QRA4XRL, XTS4TBL, BEART, N5TFUHWB     Type & Screen (If Applicable):  No results found for: LABABO, LABRH    Drug/Infectious Status (If Applicable):  No results found for: HIV, HEPCAB    COVID-19 Screening (If Applicable): No results found for: COVID19        Anesthesia Evaluation    Airway: Mallampati: I  TM distance: >3 FB   Neck ROM: full  Mouth opening: > = 3 FB   Dental:          Pulmonary:                              Cardiovascular:    (+) hypertension:,                   Neuro/Psych:               GI/Hepatic/Renal:   (+) renal disease: CRI,           Endo/Other:                     Abdominal:             Vascular:           Other Findings:           Anesthesia Plan      general     ASA 3                                   Terrilyn Habermann, MD   10/21/2022

## 2022-10-21 NOTE — H&P
History and Physical Update    Pt Name: Nehemiah Curiel  MRN: [id number]  YOB: 1980  Date of evaluation: 10/21/2022      [x] I have reviewed the progress note found in Epic by Dr. Jenny Siegel dated 10/03/22 used for an Interval History and Physical note. [x] I have examined Nehemiah Curiel a 40 y.o., female who is scheduled for a COLONOSCOPY DIAGNOSTIC, EGD ESOPHAGOGASTRODUODENOSCOPY by Dr. Krystina Malone MD due to Loose stools [R19.5]  Rectal bleeding [K62.5], Abdominal pain, unspecified abdominal location [R10.9]. The patient denies health changes since her appointment with Dr. Jenny Siegel on 10/03/22. Pt completed the bowel prep as directed and now has watery clear stool. Pt denies fever, chills, productive cough, SOB, chest pain, open sores, rashes, wounds, and history of diabetes. Pt denies taking blood thinning medication in the past 10 days. History of hemorrhoids. Pt states she has experienced intermittent bright red blood in her stool for the past 3 months. She takes Prilosec for acid reflux. Pt denies abdominal pain, nausea, vomiting, dysphagia, diarrhea, and constipation. Vital signs: BP (!) 155/88   Pulse 89   Temp 97.8 °F (36.6 °C) (Temporal)   Resp 20   Ht 5' 5\" (1.651 m)   Wt 210 lb (95.3 kg)   SpO2 97%   BMI 34.95 kg/m²      Allergies:  Prednisone, Codeine, and Darvocet a500 [propoxyphene n-acetaminophen]    Past medical history, surgical history, social history, and family history were reviewed and updated in EPIC as indicated. Medications:    Prior to Admission medications    Medication Sig Start Date End Date Taking?  Authorizing Provider   Na Sulfate-K Sulfate-Mg Sulf (SUPREP BOWEL PREP KIT) 17.5-3.13-1.6 GM/177ML SOLN solution Please follow instructions given to you by your provider 10/4/22   Krystina Malone MD   bisacodyl 5 MG EC tablet Please follow instructions given to you by your provider 10/4/22   Krystina Malone MD   omeprazole (PRILOSEC) 20 MG delayed release capsule Take 1 capsule by mouth in the morning and 1 capsule before bedtime. 8/10/22 8/10/23  TISH Carlos CNP   fluticasone (FLONASE) 50 MCG/ACT nasal spray 2 sprays by Each Nostril route daily 4/27/22   TISH Carlos CNP   enalapril (VASOTEC) 10 MG tablet Take 1 tablet by mouth daily 1/5/22   TISH Carlos CNP   montelukast (SINGULAIR) 10 MG tablet Take 1 tablet by mouth nightly 1/5/22   TISH Carlos CNP   Drospirenone 4 MG TABS Take by mouth    Historical Provider, MD   cetirizine (ZYRTEC) 10 MG tablet Take 10 mg by mouth daily    Historical Provider, MD   zinc 50 MG TABS tablet Take 50 mg by mouth daily otc    Historical Provider, MD   glucosamine-chondroitin 500-400 MG CAPS Take by mouth daily    Historical Provider, MD   magnesium (MAGNESIUM-OXIDE) 250 MG TABS tablet Take 250 mg by mouth daily    Historical Provider, MD   amLODIPine (NORVASC) 5 MG tablet TAKE 1 TABLET BY MOUTH DAILY 4/27/18   Cristofer Clayton MD   Ascorbic Acid (VITAMIN C) 250 MG tablet Take 500 mg by mouth daily     Historical Provider, MD       This is a 40 y.o. female who is pleasant, cooperative, alert and oriented x 3, in no acute distress. Obese. Heart: Regular rate and rhythm without murmur, gallop, or rub. Lungs: Normal respiratory effort, unlabored, and clear to auscultation without wheezes or rales bilaterally. Abdomen: Rotund. Soft, nontender, and active bowel sounds. Pedal pulses: 2+ bilaterally. Labs:  Recent Labs     10/21/22  0654   HCG NEGATIVE       No results for input(s): COVID19 in the last 720 hours.       Lita Felty, APRN - CNP   Electronically signed 10/21/2022 at 7:24 Yamila Cast MD   Physician   Specialty:  Gastroenterology   Progress Notes      Signed   Encounter Date:  10/3/2022          Related encounter: Office Visit from 10/3/2022 in [facility]           Signed        Expand All Collapse All        Reason for Referral:   Eloisa Sharma, APRN - CNP  [facility],  [address] [facility]          Chief Complaint   Patient presents with    Anemia       Patient is new, referred for low hgb and loose stool    Diarrhea       Patient c/o loose stool 5-8 times daily. Most BM are small. HISTORY OF PRESENT ILLNESS: Frankie Haley is a 40 y.o. female , referred for evaluation of anemia, diarrhea    Here for the first time    Lower abd pain for few months both sides, when asked she did admit to   Frequent loose stool since , not getting better   Blood in the stool  Anemia   , no periods for 1 year because of BCP  The pain has resolved and right now is just left with a loose stool  Been at her labs she is anemic  But she denied any sign of bleeding  She takes Prilosec 20 mg for her acid reflux and has not been a problem at all        Past Medical,Family, and Social History reviewed and does contribute to the patient presentingcondition. Patient's PMH/PSH,SH,PSYCH Hx, MEDs, ALLERGIES, and ROS were all reviewed and updated in the appropriate sections. PAST MEDICAL HISTORY:  Past Medical History        Past Medical History:   Diagnosis Date    Chronic kidney disease      FSGS (focal segmental glomerulosclerosis)      GERD (gastroesophageal reflux disease)      Hypertension      Proteinuria              Past Surgical History         Past Surgical History:   Procedure Laterality Date    KNEE ARTHROPLASTY         both acl and meniscus completed in high school     KNEE SURGERY        NASAL SEPTUM SURGERY   04/13/2012            CURRENT MEDICATIONS:    Current Medication      Current Outpatient Medications:     omeprazole (PRILOSEC) 20 MG delayed release capsule, Take 1 capsule by mouth in the morning and 1 capsule before bedtime. , Disp: 180 capsule, Rfl: 3    fluticasone (FLONASE) 50 MCG/ACT nasal spray, 2 sprays by Each Nostril route daily, Disp: 48 g, Rfl: 1    enalapril (VASOTEC) 10 MG tablet, Take 1 tablet by mouth daily, Disp: 90 tablet, Rfl: 3    montelukast (SINGULAIR) 10 MG tablet, Take 1 tablet by mouth nightly, Disp: 90 tablet, Rfl: 3    Drospirenone 4 MG TABS, Take by mouth, Disp: , Rfl:     cetirizine (ZYRTEC) 10 MG tablet, Take 10 mg by mouth daily, Disp: , Rfl:     zinc 50 MG TABS tablet, Take 50 mg by mouth daily otc, Disp: , Rfl:     glucosamine-chondroitin 500-400 MG CAPS, Take by mouth daily, Disp: , Rfl:     magnesium (MAGNESIUM-OXIDE) 250 MG TABS tablet, Take 250 mg by mouth daily, Disp: , Rfl:     amLODIPine (NORVASC) 5 MG tablet, TAKE 1 TABLET BY MOUTH DAILY, Disp: 90 tablet, Rfl: 3    Ascorbic Acid (VITAMIN C) 250 MG tablet, Take 500 mg by mouth daily , Disp: , Rfl:         ALLERGIES:        Allergies   Allergen Reactions    Prednisone      Codeine      Darvocet A500 [Propoxyphene N-Acetaminophen]           FAMILY HISTORY:   Family History             Problem Relation Age of Onset    High Cholesterol Father      High Blood Pressure Sister      Heart Disease Paternal Grandfather      Hypertension Paternal Grandfather      High Cholesterol Paternal Grandmother                 SOCIAL HISTORY:   Social History               Socioeconomic History    Marital status:        Spouse name: Not on file    Number of children: Not on file    Years of education: Not on file    Highest education level: Not on file   Occupational History    Not on file   Tobacco Use    Smoking status: Some Days       Packs/day: 0.50       Years: 26.00       Pack years: 13.00       Types: Cigarettes       Start date: 1996       Last attempt to quit: 2022       Years since quittin.3    Smokeless tobacco: Never   Vaping Use    Vaping Use: Never used   Substance and Sexual Activity    Alcohol use: Yes       Comment: occ    Drug use: No    Sexual activity: Yes       Partners: Male   Other Topics Concern    Not on file   Social History Narrative    Not on file      Social Determinants of Health Financial Resource Strain: Low Risk     Difficulty of Paying Living Expenses: Not hard at all   Food Insecurity: No Food Insecurity    Worried About Running Out of Food in the Last Year: Never true    Ran Out of Food in the Last Year: Never true   Transportation Needs: Not on file   Physical Activity: Not on file   Stress: Not on file   Social Connections: Not on file   Intimate Partner Violence: Not on file   Housing Stability: Not on file            REVIEW OF SYSTEMS: A 12-point review of systemswas obtained and pertinent positives and negatives were enumerated above in the history of present illness. All other reviewed systems / symptoms were negative. Review of Systems   Constitutional:  Negative for appetite change, fatigue and unexpected weight change. HENT:  Negative for trouble swallowing. Respiratory:  Negative for cough, choking, shortness of breath and wheezing. Cardiovascular:  Negative for chest pain, palpitations and leg swelling. Gastrointestinal:  Positive for blood in stool and diarrhea. Negative for abdominal distention, abdominal pain, anal bleeding, constipation, nausea, rectal pain and vomiting. Genitourinary:  Negative for difficulty urinating. Allergic/Immunologic: Negative for environmental allergies and food allergies. Neurological:  Negative for dizziness, weakness, light-headedness, numbness and headaches. Hematological:  Does not bruise/bleed easily. Psychiatric/Behavioral:  Negative for sleep disturbance. The patient is not nervous/anxious.              LABORATORY DATA: Reviewed        Lab Results   Component Value Date     WBC 9.2 08/12/2022     HGB 11.2 (L) 08/12/2022     HCT 37.0 08/12/2022     MCV 98.9 08/12/2022      08/12/2022      08/12/2022     K 5.3 08/12/2022      (H) 08/12/2022     CO2 17 (L) 08/12/2022     BUN 29 (H) 08/12/2022     CREATININE 2.15 (H) 08/12/2022     LABALBU 4.1 08/12/2022     BILITOT 0.18 (L) 08/12/2022     ALKPHOS 109 (H) 08/12/2022     AST 17 08/12/2022     ALT 19 08/12/2022                  Lab Results   Component Value Date     RBC 3.74 (L) 08/12/2022     HGB 11.2 (L) 08/12/2022     MCV 98.9 08/12/2022     MCH 29.9 08/12/2022     MCHC 30.3 08/12/2022     RDW 13.4 08/12/2022     MPV 10.8 08/12/2022     BASOPCT 1 02/09/2022     LYMPHSABS 2.92 02/09/2022     MONOSABS 0.71 02/09/2022     NEUTROABS 4.66 02/09/2022     EOSABS 0.09 02/09/2022     BASOSABS 0.11 02/09/2022            DIAGNOSTIC TESTING:      No results found. /74   Pulse 75   Temp 97.9 °F (36.6 °C)   Wt 211 lb (95.7 kg)   BMI 35.11 kg/m²      PHYSICAL EXAMINATION: Vital signs reviewed per the nursing documentation. Body mass index is 35.11 kg/m². Physical Exam  Vitals and nursing note reviewed. Constitutional:       General: She is not in acute distress. Appearance: She is well-developed. She is not diaphoretic. HENT:      Head: Normocephalic. Mouth/Throat:      Pharynx: No oropharyngeal exudate. Eyes:      General: No scleral icterus. Pupils: Pupils are equal, round, and reactive to light. Neck:      Thyroid: No thyromegaly. Vascular: No JVD. Trachea: No tracheal deviation. Cardiovascular:      Rate and Rhythm: Normal rate and regular rhythm. Heart sounds: Normal heart sounds. No murmur heard. Pulmonary:      Effort: Pulmonary effort is normal. No respiratory distress. Breath sounds: Normal breath sounds. No wheezing. Abdominal:      General: Bowel sounds are normal. There is no distension. Palpations: Abdomen is soft. Tenderness: There is no abdominal tenderness. There is no guarding or rebound. Comments: No ascites   Musculoskeletal:         General: Normal range of motion. Cervical back: Normal range of motion and neck supple. Skin:     General: Skin is warm. Coloration: Skin is not pale. Findings: No erythema or rash.       Comments: She is not diaphoretic Neurological:      Mental Status: She is alert and oriented to person, place, and time. Deep Tendon Reflexes: Reflexes are normal and symmetric. Psychiatric:         Behavior: Behavior normal.         Thought Content: Thought content normal.         Judgment: Judgment normal.         IMPRESSION: Ms. Alton Hylton is a 40 y.o. female with        Diagnosis Orders   1. Loose stools  COLONOSCOPY W/ OR W/O BIOPSY     EGD       2. Abdominal pain, left lower quadrant  COLONOSCOPY W/ OR W/O BIOPSY     EGD       3. Iron deficiency anemia due to chronic blood loss  Celiac Disease Panel     Iron and TIBC     Vitamin B12     Folate     COLONOSCOPY W/ OR W/O BIOPSY     EGD       4. Hemorrhage of rectum and anus  COLONOSCOPY W/ OR W/O BIOPSY          Will proceed with the above  Continue with the Prilosec for now        Diet/life style/natural hx /complication of the dx were all explained in details   Past medical, past surgical, social history, psychiatric history, medications or allergies, all reviewed and  updated           Thank you for allowing me to participate in the care of Ms. Alton Hylton. For any further questions please do not hesitate to contact me. I have reviewed and agree with the MA/GERHARD ROS. Note is dictated utilizing voice recognition software. Unfortunately this leads to occasional typographical errors. Please contact our office if you have any questions.      Yesenia Boswell MD  [facility]  O: #[phone]                 Revision History

## 2022-10-21 NOTE — OP NOTE
PROCEDURE NOTE    DATE OF PROCEDURE: 10/21/2022    SURGEON: Edson Barragan MD  Facility : [facility] [address]  ASSISTANT: None  Anesthesia: MAC  PREOPERATIVE DIAGNOSIS:   Abdominal pain  Iron deficiency anemia  Diarrhea      POSTOPERATIVE DIAGNOSIS: as described below    OPERATION: Total colonoscopy     ANESTHESIA: Moderate Sedation    ESTIMATED BLOOD LOSS: less than 50     COMPLICATIONS: None. SPECIMENS:  Was Obtained:   Right colon polyp measuring 5 mm removed with cold biopsy    Rectosigmoid colitis with hemorrhagic appearance and no significant ulcer could be healing colitis biopsies were taken from there    Scattered diverticular orifices throughout the entire colon not very intense but pancolonic    Hemorrhoids    The last 20 cm of the ileum looked normal with no ileitis        HISTORY: The patient is a 40y.o. year old female with history of above preop diagnosis. I recommended colonoscopy with possible biopsy or polypectomy and I explained the risk, benefits, expected outcome, and alternatives to the procedure. Risks included but are not limited to bleeding, infection, respiratory distress, hypotension, and perforation of the colon and possibility of missing a lesion. The patient understands and is in agreement. The patient was counseled at length about the risks of erich Covid-19 during their perioperative period and any recovery window from their procedure. The patient was made aware that erich Covid-19  may worsen their prognosis for recovering from their procedure  and lend to a higher morbidity and/or mortality risk. All material risks, benefits, and reasonable alternatives including postponing the procedure were discussed. The patient does wish to proceed with the procedure at this time. PROCEDURE: The patient was given IV conscious sedation. The patient's SPO2 remained above 90% throughout the procedure.      The colonoscope was inserted per rectum and advanced under direct vision to the cecum without difficulty. Post sedation note : The patient's SPO2 remained above 90% throughout the procedure. the vital signs remained stable , and no immediate complication form the procedure noted, patient will be ready for d/c when criteria is met . The prep was fair. Findings:    Right colon polyp measuring 5 mm removed with cold biopsy    Rectosigmoid colitis with hemorrhagic appearance and no significant ulcer could be healing colitis biopsies were taken from there    Scattered diverticular orifices throughout the entire colon not very intense but pancolonic    Hemorrhoids    The last 20 cm of the ileum looked normal with no ileitis            Withdrawal Time was (minutes): 12    The colon was decompressed and the scope was removed. The patient tolerated the procedure well.      Recommendations/Plan:   Lifestyle and dietary modifications as discussed  F/U Biopsies  F/U In OfficeYes  Discussed with the family  Repeat colonoscopy [id number]    Electronically signed by Bob Richardson MD  on 10/21/2022 at 8:29 AM

## 2022-10-21 NOTE — OP NOTE
PROCEDURE NOTE    DATE OF PROCEDURE: 10/21/2022     SURGEON: Sg Piedra MD  Facility: [facility] [address]  ASSISTANT: None  Anesthesia: MAC  PREOPERATIVE DIAGNOSIS:   Abdominal pain  Diarrhea  Iron deficiency anemia    Diagnosis:  Gastritis biopsies were taken  Random small bowel biopsies were taken      POSTOPERATIVE DIAGNOSIS: As described below    OPERATION: Upper GI endoscopy with Biopsy    ANESTHESIA: Moderate Sedation     ESTIMATED BLOOD LOSS: Less than 50 ml    COMPLICATIONS: None. SPECIMENS:  Was Obtained:     Gastritis biopsies were taken  Random small bowel biopsies were taken    HISTORY: The patient is a 40y.o. year old female with history of above preop diagnosis. I recommended esophagogastroduodenoscopy with possible biopsy and I explained the risk, benefits, expected outcome, and alternatives to the procedure. Risks included but are not limited to bleeding, infection, respiratory distress, hypotension, and perforation of the esophagus, stomach, or duodenum. Patient understands and is in agreement. The patient was counseled at length about the risks of erich Covid-19 during their perioperative period and any recovery window from their procedure. The patient was made aware that erich Covid-19  may worsen their prognosis for recovering from their procedure  and lend to a higher morbidity and/or mortality risk. All material risks, benefits, and reasonable alternatives including postponing the procedure were discussed. The patient does wish to proceed with the procedure at this time. PROCEDURE: The patient was given IV conscious sedation. The patient's SPO2 remained above 90% throughout the procedure. The gastroscope was inserted orally and advanced under direct vision through the esophagus, through the stomach, through the pylorus, and into the descending duodenum. Post sedation note : The patient's SPO2 remained above 90% throughout the procedure. the vital signs remained stable , and no immediate complication form the procedure noted, patient will be ready for d/c when criteria is met . Findings:    Retropharyngeal area was grossly normal appearing    Esophagus: normal    Stomach:    Fundus: normal    Body: abnormal: Gastritis biopsies were taken    Antrum: abnormal: Gastritis biopsies were taken    Duodenum:   Random small bowel biopsies were taken    The scope was removed and the patient tolerated the procedure well.      Recommendations/Plan:   F/U Biopsies  F/U In Office in 3-4 weeks  Discussed with the family    Electronically signed by Chloé Alvarenga MD  on 10/21/2022 at 8:01 AM

## 2022-10-21 NOTE — DISCHARGE INSTRUCTIONS
Upper GI Endoscopy: What to Expect at [address] [place] [address]  After you have an endoscopy, you will stay at the hospital or clinic for 1 to 2 hours. This will allow the medicine to wear off. You will be able to go home after your doctor or nurse checks to make sure you are not having any problems. You may have a sore throat for a day or two after the test.  This care sheet gives you a general idea about what to expect after the test.  How can you care for yourself at home? Activity  Rest as much as you need to after you go home. You should be able to go back to your usual activities the day after the test.  Diet  Follow your doctor's directions for eating after the test.  Drink plenty of fluids (unless your doctor has told you not to). Colonoscopy: What to Expect at [address] [place] [address]  After you have a colonoscopy, you will stay at the clinic for 1 to 2 hours until the medicines wear off. Then you can go home, but you will need to arrange for a ride. Your doctor will tell you when you can eat and do your other usual activities. Your doctor will talk to you about when you will need your next colonoscopy. The results of your test and your risk for colorectal cancer will help your doctor decide how often you need to be checked. After the test, you may be bloated or have gas pains. You may need to pass gas. If a biopsy was done or a polyp was removed, you may have streaks of blood in your stool (feces) for a few days. This care sheet gives you a general idea about how long it will take for you to recover. But each person recovers at a different pace. Follow the steps below to get better as quickly as possible. How can you care for yourself at home? Activity  Rest as much as you need to after you go home. You should be able to go back to your usual activities the day after the test.  Diet  Follow your doctors directions for eating.   Drink plenty of fluids (unless your doctor has told you not to) to replace the fluids that were lost during the colon prep. Do not drink alcohol for 24 hours after the procedure  Medicines  If polyps were removed or a biopsy was done during the test, your doctor may tell you not to take aspirin or other anti-inflammatory medicines, such as ibuprofen (Advil, Motrin) and naproxen (Aleve), for a few days. Other instructions  For your safety, you should not drive or operate machinery for 24 hours after the procedure. Do not sign legal documents or make major decisions for 24 hours. The anesthesia medicine can make it hard for you to fully understand what you are agreeing to. Follow-up care is a key part of your treatment and safety. Be sure to make and go to all appointments, and call your doctor if you are having problems. It's also a good idea to know your test results and keep a list of the medicines you take. When should you call for help? Call 911 anytime you think you may need emergency care. For example, call if:  You passed out (lost consciousness). You pass maroon or bloody stools. You have severe belly pain. You cough up blood. You vomit blood or what looks like coffee grounds. Call your doctor now or seek immediate medical care if:  Your stools are black and tarlike. Your stools have streaks of blood, but you did not have a biopsy or any polyps removed. You have belly pain, or your belly is swollen and firm. You vomit. You have a fever. You are very dizzy. You have trouble swallowing. You are sick to your stomach or cannot keep fluids down. Watch closely for changes in your health, and be sure to contact your doctor if:  Your throat still hurts after a day or two. You do not get better as expected. Watch closely for changes in your health, and be sure to contact your doctor if you have any problems. Where can you learn more? Go to [email or web address] org and sign in to your [employer] account.  Enter E264 in the SchoolFeed box to learn more about Colonoscopy: What to Expect at Home.     If you do not have an account, please click on the Sign Up Now link. © 2080-1946 [employer] Inc[employer]. Care instructions adapted under license by [facility]. This care instruction is for use with your licensed healthcare professional. If you have questions about a medical condition or this instruction, always ask your healthcare professional. [address] any warranty or liability for your use of this information.   Content Version: 7.7.887149; Last Revised: February 20, 2013      Lifestyle and dietary modifications as discussed  F/U Biopsies  F/U In Office: Yes  Repeat colonoscopy in 3 years

## 2022-10-21 NOTE — ANESTHESIA POSTPROCEDURE EVALUATION
Department of Anesthesiology  Postprocedure Note    Patient: Andreia Campuzano  MRN: [id number]  YOB: 1980  Date of evaluation: 10/21/2022      Procedure Summary     Date: 10/21/22 Room / Location: [place] [address] / [facility] - INPATIENT    Anesthesia Start: 0745 Anesthesia Stop: 0828    Procedures:       COLONOSCOPY WITH BIOPSY (Rectum)      EGD WITH BIOSPY (Esophagus) Diagnosis:       Loose stools      Rectal bleeding      Abdominal pain, unspecified abdominal location      (Loose stools [R19.5])      (Rectal bleeding [K62.5])      (Abdominal pain, unspecified abdominal location [R10.9])    Surgeons: Jaqueline Middleton MD Responsible Provider: Elizabeth Braun MD    Anesthesia Type: general ASA Status: 3          Anesthesia Type: No value filed.     Harriett Phase I: Harriett Score: 10    Harriett Phase II: Harriett Score: 7      Anesthesia Post Evaluation    Complications: no

## 2022-11-01 ENCOUNTER — TELEPHONE (OUTPATIENT)
Dept: GASTROENTEROLOGY | Age: 42
End: 2022-11-01

## 2022-11-01 DIAGNOSIS — R10.32 ABDOMINAL PAIN, LEFT LOWER QUADRANT: ICD-10-CM

## 2022-11-01 DIAGNOSIS — D50.0 IRON DEFICIENCY ANEMIA DUE TO CHRONIC BLOOD LOSS: ICD-10-CM

## 2022-11-01 DIAGNOSIS — K62.5 HEMORRHAGE OF RECTUM AND ANUS: ICD-10-CM

## 2022-11-01 DIAGNOSIS — R19.5 LOOSE STOOLS: ICD-10-CM

## 2022-11-02 LAB — SURGICAL PATHOLOGY REPORT: NORMAL

## 2022-11-18 ENCOUNTER — TELEPHONE (OUTPATIENT)
Dept: GASTROENTEROLOGY | Age: 42
End: 2022-11-18

## 2022-11-18 NOTE — TELEPHONE ENCOUNTER
Left voicemail for pt to call back and schedule appt. Pt needs scheduled in 4 weeks with Dr. Zee Payton.

## 2023-01-02 DIAGNOSIS — J30.2 SEASONAL ALLERGIES: ICD-10-CM

## 2023-01-02 DIAGNOSIS — I10 ESSENTIAL HYPERTENSION: ICD-10-CM

## 2023-01-03 RX ORDER — ENALAPRIL MALEATE 10 MG/1
10 TABLET ORAL DAILY
Qty: 90 TABLET | Refills: 1 | Status: SHIPPED | OUTPATIENT
Start: 2023-01-03

## 2023-01-03 RX ORDER — MONTELUKAST SODIUM 10 MG/1
10 TABLET ORAL NIGHTLY
Qty: 90 TABLET | Refills: 1 | Status: SHIPPED | OUTPATIENT
Start: 2023-01-03 | End: 2024-01-03

## 2023-01-03 NOTE — TELEPHONE ENCOUNTER
Request for Enalapril and Vasotec.     Last script sent: 1/5/22  Last OV: 8/10/22  Next Visit Date:  Future Appointments   Date Time Provider Tapan Vasquez   3/27/2023  2:00 PM Murray Rehman MD [address] [place] [address] Maintenance   Topic Date Due    Pneumococcal 0-64 years Vaccine (1 - PCV) Never done    DTaP/Tdap/Td vaccine (1 - Tdap) Never done    Flu vaccine (1) 08/01/2022    COVID-19 Vaccine (3 - Booster for Pfizer series) 08/10/2023 (Originally 7/2/2021)    Depression Screen  08/10/2023    GFR test (Diabetes, CKD 3-4, OR last GFR 15-59)  08/12/2023    Cervical cancer screen  04/28/2024    Colorectal Cancer Screen  10/21/2025    Lipids  05/21/2026    Hepatitis C screen  Completed    Hepatitis A vaccine  Aged Out    Hib vaccine  Aged Out    Meningococcal (ACWY) vaccine  Aged Out    Varicella vaccine  Discontinued    HIV screen  Discontinued       Hemoglobin A1C (%)   Date Value   05/21/2021 5.4             ( goal A1C is < 7)   No results found for: LABMICR  LDL Calculated (mg/dL)   Date Value   05/21/2021 110       (goal LDL is <100)   AST (U/L)   Date Value   08/12/2022 17     ALT (U/L)   Date Value   08/12/2022 19     BUN (mg/dL)   Date Value   08/12/2022 29 (H)     BP Readings from Last 3 Encounters:   10/21/22 126/85   10/03/22 124/74   08/10/22 110/70          (goal 120/80)    All Future Testing planned in [facility]  Lab Frequency Next Occurrence   Vitamin D 25 Hydroxy Once 07/15/2022   PTH, Intact Once 07/15/2022   CBC Auto Differential Once 07/15/2022   Magnesium Once 75/11/1974   Basic Metabolic Panel Once 91/25/8644   Phosphorus Once 07/15/2022   Creatinine, Random Urine Once 07/15/2022   Protein, urine, random Once 07/15/2022   Magnesium Once 01/02/2023   Phosphorus Once 60/28/5747   Basic Metabolic Panel Once 41/87/3898   PTH, Intact Once 01/02/2023   CBC with Auto Differential Once 01/02/2023   Vitamin D 25 Hydroxy Once 01/02/2023   Urinalysis Once 01/02/2023   Creatinine, Random Urine Once 01/02/2023   Protein, urine, random Once 01/02/2023   Celiac Disease Panel Once 10/03/2022   Iron and TIBC Once 01/03/2023   Vitamin B12 Once 01/03/2023   Folate Once 01/03/2023         Patient Active Problem List:     CKD (chronic kidney disease) stage 3, GFR 30-59 ml/min ([facility])     FSGS (focal segmental glomerulosclerosis)     Proteinuria     Essential hypertension     Gastroesophageal reflux disease without esophagitis     Seasonal allergies

## 2023-02-07 DIAGNOSIS — J30.2 SEASONAL ALLERGIES: ICD-10-CM

## 2023-02-07 DIAGNOSIS — I10 ESSENTIAL HYPERTENSION: ICD-10-CM

## 2023-02-07 RX ORDER — MONTELUKAST SODIUM 10 MG/1
10 TABLET ORAL NIGHTLY
Qty: 90 TABLET | Refills: 1 | OUTPATIENT
Start: 2023-02-07 | End: 2024-02-07

## 2023-02-07 RX ORDER — ENALAPRIL MALEATE 10 MG/1
10 TABLET ORAL DAILY
Qty: 90 TABLET | Refills: 1 | OUTPATIENT
Start: 2023-02-07

## 2023-03-16 NOTE — PROGRESS NOTES
GI CLINIC FOLLOW UP    INTERVAL HISTORY:   No referring provider defined for this encounter. Chief Complaint   Patient presents with    Other     Patient is f/u on EGD/colon. She c/o loose stool 3-4 times daily about 30 minutes after eating. Sometimes she gets nausea then has a BM and the nausea goes away           HISTORY OF PRESENT ILLNESS: Beau Machado is a 44 y.o. female , referred for evaluation of evaluation of anemia, diarrhea           Here for f/u   Seen with above    Last visit   Egd/colon , celiac, anemia w/u,   Labs all normal, including the celiac markers and the anemia work-up. Egd/colon as belwo   ? UC per path, as I still some resolving colitis with edema but the biopsy seems to be very concerning as below. And the patient still having   4-6 BM, semi solid    No pain no bleeding no nausea no vomiting no weight loss        Findings:       Retropharyngeal area was grossly normal appearing       Esophagus: normal       Stomach:     Fundus: normal     Body: abnormal: Gastritis biopsies were taken     Antrum: abnormal: Gastritis biopsies were taken       Duodenum:   Random small bowel biopsies were taken       The scope was removed and the patient tolerated the procedure well. The prep was fair. Findings:       Right colon polyp measuring 5 mm removed with cold biopsy       Rectosigmoid colitis with hemorrhagic appearance and no significant ulcer could be healing colitis biopsies were taken from there       Scattered diverticular orifices throughout the entire colon not very intense but pancolonic       Hemorrhoids       The last 20 cm of the ileum looked normal with no ileitis        Withdrawal Time was (minutes): 12       The colon was decompressed and the scope was removed. The patient tolerated the procedure well. Recommendations/Plan:   1. Lifestyle and dietary modifications as discussed   2. F/U Biopsies   3. F/U In OfficeYes   4. Discussed with the family   5.

## 2023-03-21 ENCOUNTER — HOSPITAL ENCOUNTER (OUTPATIENT)
Age: 43
Setting detail: SPECIMEN
Discharge: HOME | End: 2023-03-21

## 2023-03-21 DIAGNOSIS — D50.0 IRON DEFICIENCY ANEMIA DUE TO CHRONIC BLOOD LOSS: ICD-10-CM

## 2023-03-21 LAB
IRON SATURATION: 38 % (ref 20–55)
IRON SERPL-MCNC: 94 UG/DL (ref 37–145)
TIBC SERPL-MCNC: 247 UG/DL (ref 250–450)
UNSATURATED IRON BINDING CAPACITY: 153 UG/DL (ref 112–347)
VIT B12 SERPL-MCNC: 386 PG/ML (ref 232–1245)

## 2023-03-23 LAB
GLIADIN IGA SER IA-ACNC: 0.9 U/ML
GLIADIN IGG SER IA-ACNC: <0.4 U/ML
IGA SERPL-MCNC: 175 MG/DL (ref 70–400)
TTG IGA SER IA-ACNC: 0.5 U/ML

## 2023-03-27 ENCOUNTER — OFFICE VISIT (OUTPATIENT)
Dept: GASTROENTEROLOGY | Age: 43
End: 2023-03-27
Payer: COMMERCIAL

## 2023-03-27 VITALS
SYSTOLIC BLOOD PRESSURE: 128 MMHG | BODY MASS INDEX: 34.61 KG/M2 | WEIGHT: 208 LBS | HEART RATE: 96 BPM | DIASTOLIC BLOOD PRESSURE: 81 MMHG

## 2023-03-27 DIAGNOSIS — N05.1 FSGS (FOCAL SEGMENTAL GLOMERULOSCLEROSIS): ICD-10-CM

## 2023-03-27 DIAGNOSIS — K52.9 COLITIS: Primary | ICD-10-CM

## 2023-03-27 DIAGNOSIS — R19.5 LOOSE STOOLS: ICD-10-CM

## 2023-03-27 DIAGNOSIS — K21.9 GASTROESOPHAGEAL REFLUX DISEASE WITHOUT ESOPHAGITIS: ICD-10-CM

## 2023-03-27 PROCEDURE — 99214 OFFICE O/P EST MOD 30 MIN: CPT | Performed by: INTERNAL MEDICINE

## 2023-03-27 PROCEDURE — 3074F SYST BP LT 130 MM HG: CPT | Performed by: INTERNAL MEDICINE

## 2023-03-27 PROCEDURE — 3079F DIAST BP 80-89 MM HG: CPT | Performed by: INTERNAL MEDICINE

## 2023-03-27 RX ORDER — BUDESONIDE 3 MG/1
9 CAPSULE, COATED PELLETS ORAL EVERY MORNING
Qty: 90 CAPSULE | Refills: 0 | Status: SHIPPED | OUTPATIENT
Start: 2023-03-27 | End: 2023-04-26

## 2023-03-27 ASSESSMENT — ENCOUNTER SYMPTOMS
SHORTNESS OF BREATH: 0
ABDOMINAL PAIN: 0
TROUBLE SWALLOWING: 0
NAUSEA: 0
DIARRHEA: 1
CHOKING: 0
VOMITING: 0
ABDOMINAL DISTENTION: 0
ANAL BLEEDING: 0
WHEEZING: 0
COUGH: 0
RECTAL PAIN: 0
BLOOD IN STOOL: 0
CONSTIPATION: 0

## 2023-03-30 LAB — FOLATE SERPL-MCNC: 10.4 NG/ML

## 2023-04-06 ENCOUNTER — HOSPITAL ENCOUNTER (OUTPATIENT)
Age: 43
Setting detail: SPECIMEN
Discharge: HOME | End: 2023-04-06
Payer: COMMERCIAL

## 2023-04-06 ENCOUNTER — HOSPITAL ENCOUNTER (OUTPATIENT)
Age: 43
Setting detail: SPECIMEN
End: 2023-04-06
Payer: COMMERCIAL

## 2023-04-06 DIAGNOSIS — R80.9 NON-NEPHROTIC RANGE PROTEINURIA: ICD-10-CM

## 2023-04-06 DIAGNOSIS — N05.1 FSGS (FOCAL SEGMENTAL GLOMERULOSCLEROSIS): ICD-10-CM

## 2023-04-06 DIAGNOSIS — N18.31 STAGE 3A CHRONIC KIDNEY DISEASE (HCC): ICD-10-CM

## 2023-04-06 DIAGNOSIS — I10 ESSENTIAL HYPERTENSION: ICD-10-CM

## 2023-04-06 DIAGNOSIS — K52.9 COLITIS: ICD-10-CM

## 2023-04-06 LAB
25(OH)D3 SERPL-MCNC: 28.2 NG/ML
ABSOLUTE EOS #: 0.04 K/UL (ref 0–0.44)
ABSOLUTE IMMATURE GRANULOCYTE: 0.06 K/UL (ref 0–0.3)
ABSOLUTE LYMPH #: 2.09 K/UL (ref 1.1–3.7)
ABSOLUTE MONO #: 0.7 K/UL (ref 0.1–1.2)
ANION GAP SERPL CALCULATED.3IONS-SCNC: 14 MMOL/L (ref 9–17)
BASOPHILS # BLD: 1 % (ref 0–2)
BASOPHILS ABSOLUTE: 0.1 K/UL (ref 0–0.2)
BILIRUBIN URINE: NEGATIVE
BUN SERPL-MCNC: 35 MG/DL (ref 6–20)
CALCIUM SERPL-MCNC: 9.7 MG/DL (ref 8.6–10.4)
CHLORIDE SERPL-SCNC: 110 MMOL/L (ref 98–107)
CO2 SERPL-SCNC: 17 MMOL/L (ref 20–31)
COLOR: YELLOW
CREAT SERPL-MCNC: 1.68 MG/DL (ref 0.5–0.9)
CREATININE URINE: 37.6 MG/DL (ref 28–217)
EOSINOPHILS RELATIVE PERCENT: 0 % (ref 1–4)
EPITHELIAL CELLS UA: NORMAL /HPF (ref 0–5)
GFR SERPL CREATININE-BSD FRML MDRD: 39 ML/MIN/1.73M2
GLUCOSE SERPL-MCNC: 114 MG/DL (ref 70–99)
GLUCOSE UR STRIP.AUTO-MCNC: NEGATIVE MG/DL
HCT VFR BLD AUTO: 35.9 % (ref 36.3–47.1)
HGB BLD-MCNC: 11.4 G/DL (ref 11.9–15.1)
IMMATURE GRANULOCYTES: 1 %
KETONES UR STRIP.AUTO-MCNC: NEGATIVE MG/DL
LEUKOCYTE ESTERASE UR QL STRIP.AUTO: NEGATIVE
LYMPHOCYTES # BLD: 19 % (ref 24–43)
MAGNESIUM SERPL-MCNC: 1.9 MG/DL (ref 1.6–2.6)
MCH RBC QN AUTO: 30.3 PG (ref 25.2–33.5)
MCHC RBC AUTO-ENTMCNC: 31.8 G/DL (ref 28.4–34.8)
MCV RBC AUTO: 95.5 FL (ref 82.6–102.9)
MONOCYTES # BLD: 6 % (ref 3–12)
NITRITE UR QL STRIP.AUTO: NEGATIVE
NRBC AUTOMATED: 0 PER 100 WBC
PDW BLD-RTO: 15.3 % (ref 11.8–14.4)
PHOSPHATE SERPL-MCNC: 2.7 MG/DL (ref 2.6–4.5)
PLATELET # BLD AUTO: 269 K/UL (ref 138–453)
PMV BLD AUTO: 11.2 FL (ref 8.1–13.5)
POTASSIUM SERPL-SCNC: 4.9 MMOL/L (ref 3.7–5.3)
PROT UR STRIP.AUTO-MCNC: 5.5 MG/DL (ref 5–8)
PROT UR STRIP.AUTO-MCNC: ABNORMAL MG/DL
PTH-INTACT SERPL-MCNC: 50.5 PG/ML (ref 14–72)
RBC # BLD: 3.76 M/UL (ref 3.95–5.11)
RBC # BLD: ABNORMAL 10*6/UL
RBC CLUMPS #/AREA URNS AUTO: NORMAL /HPF (ref 0–4)
SEG NEUTROPHILS: 73 % (ref 36–65)
SEGMENTED NEUTROPHILS ABSOLUTE COUNT: 8.29 K/UL (ref 1.5–8.1)
SODIUM SERPL-SCNC: 141 MMOL/L (ref 135–144)
SPECIFIC GRAVITY UA: 1.01 (ref 1–1.03)
TOTAL PROTEIN, URINE: 24 MG/DL
TURBIDITY: CLEAR
URINE HGB: NEGATIVE
UROBILINOGEN, URINE: NORMAL
WBC # BLD AUTO: 11.3 K/UL (ref 3.5–11.3)
WBC UA: NORMAL /HPF (ref 0–5)

## 2023-04-06 PROCEDURE — 82306 VITAMIN D 25 HYDROXY: CPT

## 2023-04-06 PROCEDURE — 88346 IMFLUOR 1ST 1ANTB STAIN PX: CPT

## 2023-04-06 PROCEDURE — 80048 BASIC METABOLIC PNL TOTAL CA: CPT

## 2023-04-06 PROCEDURE — 83970 ASSAY OF PARATHORMONE: CPT

## 2023-04-06 PROCEDURE — 86140 C-REACTIVE PROTEIN: CPT

## 2023-04-06 PROCEDURE — 88350 IMFLUOR EA ADDL 1ANTB STN PX: CPT

## 2023-04-06 PROCEDURE — 82570 ASSAY OF URINE CREATININE: CPT

## 2023-04-06 PROCEDURE — 82397 CHEMILUMINESCENT ASSAY: CPT

## 2023-04-06 PROCEDURE — 83520 IMMUNOASSAY QUANT NOS NONAB: CPT

## 2023-04-06 PROCEDURE — 81479 UNLISTED MOLECULAR PATHOLOGY: CPT

## 2023-04-06 PROCEDURE — 85652 RBC SED RATE AUTOMATED: CPT

## 2023-04-06 PROCEDURE — 81001 URINALYSIS AUTO W/SCOPE: CPT

## 2023-04-06 PROCEDURE — 84156 ASSAY OF PROTEIN URINE: CPT

## 2023-04-06 PROCEDURE — 36415 COLL VENOUS BLD VENIPUNCTURE: CPT

## 2023-04-06 PROCEDURE — 84100 ASSAY OF PHOSPHORUS: CPT

## 2023-04-06 PROCEDURE — 83735 ASSAY OF MAGNESIUM: CPT

## 2023-04-06 PROCEDURE — 85025 COMPLETE CBC W/AUTO DIFF WBC: CPT

## 2023-04-07 LAB
CRP SERPL HS-MCNC: 4.7 MG/L (ref 0–5)
ERYTHROCYTE [SEDIMENTATION RATE] IN BLOOD BY WESTERGREN METHOD: 17 MM/HR (ref 0–20)

## 2023-05-04 RX ORDER — BUDESONIDE 3 MG/1
9 CAPSULE, COATED PELLETS ORAL EVERY MORNING
Qty: 90 CAPSULE | Refills: 0 | OUTPATIENT
Start: 2023-05-04 | End: 2023-06-03

## 2023-05-16 RX ORDER — BUDESONIDE 3 MG/1
CAPSULE, COATED PELLETS ORAL
Qty: 90 CAPSULE | Refills: 1 | Status: SHIPPED | OUTPATIENT
Start: 2023-05-16

## 2023-11-14 ENCOUNTER — HOSPITAL ENCOUNTER (OUTPATIENT)
Age: 43
Setting detail: SPECIMEN
Discharge: HOME | End: 2023-11-14

## 2023-11-14 DIAGNOSIS — R80.9 NON-NEPHROTIC RANGE PROTEINURIA: ICD-10-CM

## 2023-11-14 DIAGNOSIS — I10 ESSENTIAL HYPERTENSION: ICD-10-CM

## 2023-11-14 DIAGNOSIS — N05.1 FSGS (FOCAL SEGMENTAL GLOMERULOSCLEROSIS): ICD-10-CM

## 2023-11-14 DIAGNOSIS — N18.31 STAGE 3A CHRONIC KIDNEY DISEASE (HCC): ICD-10-CM

## 2023-11-14 LAB
ANION GAP SERPL CALCULATED.3IONS-SCNC: 13 MMOL/L (ref 9–16)
BACTERIA URNS QL MICRO: ABNORMAL
BASOPHILS # BLD: 0.08 K/UL (ref 0–0.2)
BASOPHILS NFR BLD: 1 % (ref 0–2)
BILIRUB UR QL STRIP: NEGATIVE
BUN SERPL-MCNC: 30 MG/DL (ref 6–20)
CALCIUM SERPL-MCNC: 9.3 MG/DL (ref 8.6–10.4)
CASTS #/AREA URNS LPF: ABNORMAL /LPF (ref 0–8)
CHLORIDE SERPL-SCNC: 105 MMOL/L (ref 98–107)
CLARITY UR: CLEAR
CO2 SERPL-SCNC: 20 MMOL/L (ref 20–31)
COLOR UR: YELLOW
CREAT SERPL-MCNC: 2.1 MG/DL (ref 0.5–0.9)
CREAT UR-MCNC: 217.1 MG/DL (ref 28–217)
EOSINOPHIL # BLD: 0.1 K/UL (ref 0–0.44)
EOSINOPHILS RELATIVE PERCENT: 1 % (ref 1–4)
EPI CELLS #/AREA URNS HPF: ABNORMAL /HPF (ref 0–5)
ERYTHROCYTE [DISTWIDTH] IN BLOOD BY AUTOMATED COUNT: 13.2 % (ref 11.8–14.4)
GFR SERPL CREATININE-BSD FRML MDRD: 30 ML/MIN/1.73M2
GLUCOSE SERPL-MCNC: 95 MG/DL (ref 74–99)
GLUCOSE UR STRIP-MCNC: NEGATIVE MG/DL
HCT VFR BLD AUTO: 37.7 % (ref 36.3–47.1)
HGB BLD-MCNC: 11.8 G/DL (ref 11.9–15.1)
HGB UR QL STRIP.AUTO: NEGATIVE
IMM GRANULOCYTES # BLD AUTO: <0.03 K/UL (ref 0–0.3)
IMM GRANULOCYTES NFR BLD: 0 %
KETONES UR STRIP-MCNC: ABNORMAL MG/DL
LEUKOCYTE ESTERASE UR QL STRIP: NEGATIVE
LYMPHOCYTES NFR BLD: 2.35 K/UL (ref 1.1–3.7)
LYMPHOCYTES RELATIVE PERCENT: 33 % (ref 24–43)
MAGNESIUM SERPL-MCNC: 2 MG/DL (ref 1.6–2.6)
MCH RBC QN AUTO: 29.6 PG (ref 25.2–33.5)
MCHC RBC AUTO-ENTMCNC: 31.3 G/DL (ref 28.4–34.8)
MCV RBC AUTO: 94.5 FL (ref 82.6–102.9)
MONOCYTES NFR BLD: 0.62 K/UL (ref 0.1–1.2)
MONOCYTES NFR BLD: 9 % (ref 3–12)
NEUTROPHILS NFR BLD: 56 % (ref 36–65)
NEUTS SEG NFR BLD: 4.04 K/UL (ref 1.5–8.1)
NITRITE UR QL STRIP: NEGATIVE
NRBC BLD-RTO: 0 PER 100 WBC
PH UR STRIP: 5.5 [PH] (ref 5–8)
PHOSPHATE SERPL-MCNC: 4.5 MG/DL (ref 2.5–4.5)
PLATELET # BLD AUTO: 273 K/UL (ref 138–453)
PMV BLD AUTO: 11 FL (ref 8.1–13.5)
POTASSIUM SERPL-SCNC: 5.1 MMOL/L (ref 3.7–5.3)
PROT UR STRIP-MCNC: ABNORMAL MG/DL
PTH-INTACT SERPL-MCNC: 87.8 PG/ML (ref 14–72)
RBC # BLD AUTO: 3.99 M/UL (ref 3.95–5.11)
RBC #/AREA URNS HPF: ABNORMAL /HPF (ref 0–4)
SODIUM SERPL-SCNC: 138 MMOL/L (ref 136–145)
SP GR UR STRIP: 1.01 (ref 1–1.03)
TOTAL PROTEIN, URINE: 102 MG/DL
UROBILINOGEN UR STRIP-ACNC: NORMAL EU/DL (ref 0–1)
WBC #/AREA URNS HPF: ABNORMAL /HPF (ref 0–5)
WBC OTHER # BLD: 7.2 K/UL (ref 3.5–11.3)

## 2023-11-15 LAB — 25(OH)D3 SERPL-MCNC: 30.7 NG/ML (ref 30–100)

## 2024-02-22 ENCOUNTER — OFFICE VISIT (OUTPATIENT)
Dept: PRIMARY CARE CLINIC | Age: 44
End: 2024-02-22
Payer: COMMERCIAL

## 2024-02-22 VITALS
HEART RATE: 84 BPM | DIASTOLIC BLOOD PRESSURE: 82 MMHG | TEMPERATURE: 98.6 F | WEIGHT: 185 LBS | OXYGEN SATURATION: 98 % | BODY MASS INDEX: 30.79 KG/M2 | SYSTOLIC BLOOD PRESSURE: 126 MMHG

## 2024-02-22 DIAGNOSIS — J06.9 VIRAL URI WITH COUGH: ICD-10-CM

## 2024-02-22 DIAGNOSIS — R05.1 ACUTE COUGH: ICD-10-CM

## 2024-02-22 DIAGNOSIS — R53.83 FATIGUE, UNSPECIFIED TYPE: ICD-10-CM

## 2024-02-22 DIAGNOSIS — R52 BODY ACHES: Primary | ICD-10-CM

## 2024-02-22 PROCEDURE — 3079F DIAST BP 80-89 MM HG: CPT | Performed by: PHYSICIAN ASSISTANT

## 2024-02-22 PROCEDURE — 87804 INFLUENZA ASSAY W/OPTIC: CPT | Performed by: PHYSICIAN ASSISTANT

## 2024-02-22 PROCEDURE — 99213 OFFICE O/P EST LOW 20 MIN: CPT | Performed by: PHYSICIAN ASSISTANT

## 2024-02-22 PROCEDURE — 3074F SYST BP LT 130 MM HG: CPT | Performed by: PHYSICIAN ASSISTANT

## 2024-02-22 RX ORDER — BENZONATATE 200 MG/1
200 CAPSULE ORAL 3 TIMES DAILY PRN
Qty: 21 CAPSULE | Refills: 0 | Status: SHIPPED | OUTPATIENT
Start: 2024-02-22 | End: 2024-02-29

## 2024-02-22 RX ORDER — PREDNISONE 10 MG/1
10 TABLET ORAL 2 TIMES DAILY
Qty: 10 TABLET | Refills: 0 | Status: SHIPPED | OUTPATIENT
Start: 2024-02-22 | End: 2024-02-27

## 2024-02-22 RX ORDER — FLUTICASONE PROPIONATE 50 MCG
1 SPRAY, SUSPENSION (ML) NASAL DAILY
Qty: 16 G | Refills: 0 | Status: SHIPPED | OUTPATIENT
Start: 2024-02-22

## 2024-02-22 NOTE — PROGRESS NOTES
[facility] In  [address] [place] [id number]  Phone: [phone]  Fax: [phone]       [facility] WALK - IN    Pt Name: Nela Busch  MRN: [id number]  Birthdate 1980  Date of evaluation: 2/22/2024  Provider: Amanda Lechuga PA-C     CHIEF COMPLAINT       Chief Complaint   Patient presents with    Generalized Body Aches     Body aches, chills, cough, headache, fatigue, nasal congestion/drainage, fever, and diarrhea for 2-3 days. Denies nausea, vomiting, ear pain, sob, or chest pain.            HISTORY OF PRESENT ILLNESS  (Location/Symptom, Timing/Onset, Context/Setting, Quality, Duration, Modifying Factors, Severity.)   Nela Busch is a 43 y.o. White (non-) [1] female who presents to the office for evaluation of      Generalized Body Aches  Associated symptoms include chills, congestion, coughing, fatigue, a fever, headaches, myalgias and a sore throat. Pertinent negatives include no nausea or vomiting. Associated symptoms comments: Diarrhea.       Nursing Notes were reviewed.    REVIEW OF SYSTEMS    (2-9 systems for level 4, 10 or more for level 5)     Review of Systems   Constitutional:  Positive for chills, fatigue and fever.   HENT:  Positive for congestion, postnasal drip, sinus pressure and sore throat. Negative for ear discharge and ear pain.    Eyes: Negative.    Respiratory:  Positive for cough.    Gastrointestinal:  Positive for diarrhea. Negative for nausea and vomiting.   Genitourinary: Negative.    Musculoskeletal:  Positive for myalgias.   Neurological:  Positive for headaches.         Except as noted above the remainder of the review of systems was reviewed andnegative.       PAST MEDICAL HISTORY   History reviewed.    Past Medical History:   Diagnosis Date    Chronic kidney disease     FSGS (focal segmental glomerulosclerosis)     GERD (gastroesophageal reflux disease)     Hypertension     Proteinuria          SURGICAL HISTORY     History reviewed.    Past

## 2024-03-05 SDOH — HEALTH STABILITY: PHYSICAL HEALTH: ON AVERAGE, HOW MANY DAYS PER WEEK DO YOU ENGAGE IN MODERATE TO STRENUOUS EXERCISE (LIKE A BRISK WALK)?: 2 DAYS

## 2024-03-06 ENCOUNTER — OFFICE VISIT (OUTPATIENT)
Dept: FAMILY MEDICINE CLINIC | Age: 44
End: 2024-03-06
Payer: COMMERCIAL

## 2024-03-06 VITALS
BODY MASS INDEX: 31.65 KG/M2 | HEART RATE: 83 BPM | HEIGHT: 65 IN | SYSTOLIC BLOOD PRESSURE: 118 MMHG | OXYGEN SATURATION: 98 % | TEMPERATURE: 97.6 F | WEIGHT: 190 LBS | DIASTOLIC BLOOD PRESSURE: 78 MMHG

## 2024-03-06 DIAGNOSIS — Z00.00 ANNUAL PHYSICAL EXAM: Primary | ICD-10-CM

## 2024-03-06 DIAGNOSIS — I10 ESSENTIAL HYPERTENSION: ICD-10-CM

## 2024-03-06 DIAGNOSIS — Z13.220 SCREENING CHOLESTEROL LEVEL: ICD-10-CM

## 2024-03-06 DIAGNOSIS — Z13.29 THYROID DISORDER SCREENING: ICD-10-CM

## 2024-03-06 DIAGNOSIS — Z13.1 DIABETES MELLITUS SCREENING: ICD-10-CM

## 2024-03-06 DIAGNOSIS — K21.9 GASTROESOPHAGEAL REFLUX DISEASE WITHOUT ESOPHAGITIS: ICD-10-CM

## 2024-03-06 DIAGNOSIS — N18.30 STAGE 3 CHRONIC KIDNEY DISEASE, UNSPECIFIED WHETHER STAGE 3A OR 3B CKD (HCC): ICD-10-CM

## 2024-03-06 PROCEDURE — 99396 PREV VISIT EST AGE 40-64: CPT

## 2024-03-06 PROCEDURE — 3078F DIAST BP <80 MM HG: CPT

## 2024-03-06 PROCEDURE — 3074F SYST BP LT 130 MM HG: CPT

## 2024-03-06 SDOH — ECONOMIC STABILITY: FOOD INSECURITY: WITHIN THE PAST 12 MONTHS, THE FOOD YOU BOUGHT JUST DIDN'T LAST AND YOU DIDN'T HAVE MONEY TO GET MORE.: NEVER TRUE

## 2024-03-06 SDOH — ECONOMIC STABILITY: HOUSING INSECURITY
IN THE LAST 12 MONTHS, WAS THERE A TIME WHEN YOU DID NOT HAVE A STEADY PLACE TO SLEEP OR SLEPT IN A SHELTER (INCLUDING NOW)?: NO

## 2024-03-06 SDOH — ECONOMIC STABILITY: FOOD INSECURITY: WITHIN THE PAST 12 MONTHS, YOU WORRIED THAT YOUR FOOD WOULD RUN OUT BEFORE YOU GOT MONEY TO BUY MORE.: NEVER TRUE

## 2024-03-06 SDOH — ECONOMIC STABILITY: INCOME INSECURITY: HOW HARD IS IT FOR YOU TO PAY FOR THE VERY BASICS LIKE FOOD, HOUSING, MEDICAL CARE, AND HEATING?: NOT HARD AT ALL

## 2024-03-06 ASSESSMENT — ENCOUNTER SYMPTOMS
SHORTNESS OF BREATH: 0
RHINORRHEA: 0
WHEEZING: 0
CONSTIPATION: 0
DIARRHEA: 0

## 2024-03-06 ASSESSMENT — PATIENT HEALTH QUESTIONNAIRE - PHQ9
SUM OF ALL RESPONSES TO PHQ9 QUESTIONS 1 & 2: 0
SUM OF ALL RESPONSES TO PHQ QUESTIONS 1-9: 0
2. FEELING DOWN, DEPRESSED OR HOPELESS: 0
SUM OF ALL RESPONSES TO PHQ QUESTIONS 1-9: 0
1. LITTLE INTEREST OR PLEASURE IN DOING THINGS: 0
SUM OF ALL RESPONSES TO PHQ QUESTIONS 1-9: 0
SUM OF ALL RESPONSES TO PHQ QUESTIONS 1-9: 0

## 2024-03-06 NOTE — PROGRESS NOTES
[facility]  [facility] PRIMARY [facility]ARE [address] [address]  Dept: [phone]        CHIEF COMPLAINT:      Chief Complaint   Patient presents with    New Patient       SUBJECTIVE      Nela Busch is a 44 y.o. female who presents to [facility] care. Previous patient of aRchel German Np. Was last seen in 2022.     Nephrology-Dr. Hernandes, Last seen November 2023. Was to continue vasotec and amlodipine and they were to keep eye on bicarbonate. Is to follow up in 6 months  .   OBGYN- Mami Marsh at [facility].     Saw Dr. Rehman in past for likely colitis-was started on budesonide. States she was having issues with her bowels. Said they never got an answer and not currently having issues. Currently taking omeprazole once a day.     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       Review of Systems   Constitutional:  Negative for fatigue and unexpected weight change.   HENT:  Negative for congestion and rhinorrhea.    Respiratory:  Negative for shortness of breath and wheezing.    Cardiovascular:  Negative for chest pain and palpitations.   Gastrointestinal:  Negative for constipation and diarrhea.   Genitourinary:  Negative for difficulty urinating, hematuria, menstrual problem and vaginal discharge.   Musculoskeletal:  Negative for arthralgias and myalgias.   Skin:  Negative for rash and wound.   Neurological:  Negative for dizziness and headaches.   Psychiatric/Behavioral:  Negative for decreased concentration, dysphoric mood and sleep disturbance. The patient is not nervous/anxious.         HISTORY:        Past Medical History:   Diagnosis Date    Chronic kidney disease     FSGS (focal segmental

## 2024-07-19 DIAGNOSIS — K21.9 GASTROESOPHAGEAL REFLUX DISEASE WITHOUT ESOPHAGITIS: ICD-10-CM

## 2024-07-19 RX ORDER — FLUTICASONE PROPIONATE 50 MCG
1 SPRAY, SUSPENSION (ML) NASAL DAILY
Qty: 16 G | Refills: 0 | OUTPATIENT
Start: 2024-07-19

## (undated) DEVICE — FORCEPS BX L240CM WRK CHN 2.8MM STD CAP W/ NDL MIC MESH

## (undated) DEVICE — GAUZE,SPONGE,4"X4",16PLY,STRL,LF,10/TRAY: Brand: MEDLINE

## (undated) DEVICE — BASIN EMSIS 700ML GRAPHITE PLAS KID SHP GRAD

## (undated) DEVICE — YANKAUER,FLEXIBLE HANDLE,REGLR CAPACITY: Brand: MEDLINE INDUSTRIES, INC.

## (undated) DEVICE — JELLY,LUBE,STERILE,FLIP TOP,TUBE,2-OZ: Brand: MEDLINE

## (undated) DEVICE — BLOCK BITE 60FR RUBBER ADLT DENTAL

## (undated) DEVICE — MEDICINE CUP, GRADUATED, STER: Brand: MEDLINE

## (undated) DEVICE — FORCEPS BX L240CM JAW DIA2.8MM L CAP W/ NDL MIC MESH TOOTH

## (undated) DEVICE — TUBING, SUCTION, 1/4" X 12', STRAIGHT: Brand: MEDLINE

## (undated) DEVICE — ADAPTER TBNG LUER STUB 15 GA INTMED

## (undated) DEVICE — Device: Brand: DEFENDO VALVE AND CONNECTOR KIT

## (undated) DEVICE — GOWN,AURORA,NONREINFORCED,LARGE: Brand: MEDLINE

## (undated) DEVICE — SYRINGE MED 50ML LUERLOCK TIP

## (undated) DEVICE — CO2 CANNULA,SUPERSOFT, ADLT,7'O2,7'CO2: Brand: MEDLINE